# Patient Record
Sex: FEMALE | Race: BLACK OR AFRICAN AMERICAN | Employment: FULL TIME | URBAN - METROPOLITAN AREA
[De-identification: names, ages, dates, MRNs, and addresses within clinical notes are randomized per-mention and may not be internally consistent; named-entity substitution may affect disease eponyms.]

---

## 2017-02-02 ENCOUNTER — OFFICE VISIT (OUTPATIENT)
Dept: INTERNAL MEDICINE CLINIC | Age: 52
End: 2017-02-02

## 2017-02-02 VITALS
SYSTOLIC BLOOD PRESSURE: 129 MMHG | HEART RATE: 63 BPM | WEIGHT: 138.5 LBS | RESPIRATION RATE: 14 BRPM | BODY MASS INDEX: 21.74 KG/M2 | TEMPERATURE: 97.3 F | DIASTOLIC BLOOD PRESSURE: 81 MMHG | OXYGEN SATURATION: 100 % | HEIGHT: 67 IN

## 2017-02-02 DIAGNOSIS — Z13.0 SCREENING FOR DEFICIENCY ANEMIA: ICD-10-CM

## 2017-02-02 DIAGNOSIS — Z13.1 SCREENING FOR DIABETES MELLITUS: ICD-10-CM

## 2017-02-02 DIAGNOSIS — Z00.00 PHYSICAL EXAM, ANNUAL: Primary | ICD-10-CM

## 2017-02-02 DIAGNOSIS — Z11.59 NEED FOR HEPATITIS C SCREENING TEST: ICD-10-CM

## 2017-02-02 DIAGNOSIS — Z13.220 SCREENING FOR HYPERCHOLESTEROLEMIA: ICD-10-CM

## 2017-02-02 NOTE — PROGRESS NOTES
HISTORY OF PRESENT ILLNESS  Derik Mckay is a 46 y.o. female. HPI   She presents for annual physical.    Diet and Lifestyle: generally follows a low fat low cholesterol diet, generally follows a low sodium diet, exercises sporadically, nonsmoker     She was born in the [facility] and is a candidate for Hepatitis C screening. There is no problem list on file for this patient. History reviewed. No pertinent past medical history. Past Surgical History   Procedure Laterality Date    Pr myomectomy 1-4,w/tot 250gms/<,abd apprch      Hx hysterectomy  2009     Social History     Social History    Marital status:      Spouse name: N/A    Number of children: N/A    Years of education: N/A     Social History Main Topics    Smoking status: Never Smoker    Smokeless tobacco: Never Used    Alcohol use Yes      Comment: rare     Drug use: No    Sexual activity: Not Asked     Other Topics Concern    None     Social History Narrative     Family History   Problem Relation Age of Onset    Hypertension Mother      Allergies   Allergen Reactions    Naproxen Sodium Nausea and Vomiting    Other Medication Other (comments)     ? antibiotic     Current Outpatient Prescriptions   Medication Sig Dispense Refill    Cetirizine (ZYRTEC) 10 mg cap Take  by mouth. Review of Systems   Constitutional: Negative for malaise/fatigue and weight loss. HENT: Negative for congestion. Eyes: Negative for blurred vision and double vision. Respiratory: Negative for shortness of breath. Cardiovascular: Negative for chest pain, palpitations, orthopnea, claudication and leg swelling. Gastrointestinal: Negative for constipation, diarrhea and heartburn. Genitourinary: Negative for frequency and urgency. Musculoskeletal: Negative for back pain and joint pain. Skin: Negative for itching and rash. Neurological: Negative for dizziness, tingling, focal weakness and headaches. Psychiatric/Behavioral: Negative for depression. The patient is not nervous/anxious. Visit Vitals    /81 (BP 1 Location: Left arm, BP Patient Position: Sitting)    Pulse 63    Temp 97.3 °F (36.3 °C) (Oral)    Resp 14    Ht 5' 7\" (1.702 m)    Wt 138 lb 8 oz (62.8 kg)    SpO2 100%    BMI 21.69 kg/m2     Physical Exam   Constitutional: She is oriented to person, place, and time. She appears well-developed and well-nourished. HENT:   Head: Normocephalic and atraumatic. Eyes: Conjunctivae are normal. Pupils are equal, round, and reactive to light. Neck: Neck supple. Carotid bruit is not present. No thyromegaly present. Cardiovascular: Normal rate, regular rhythm and normal heart sounds. PMI is not displaced. Exam reveals no gallop. No murmur heard. Pulses:       Dorsalis pedis pulses are 2+ on the right side, and 2+ on the left side. Posterior tibial pulses are 2+ on the right side, and 2+ on the left side. Pulmonary/Chest: Effort normal. She has no wheezes. She has no rhonchi. She has no rales. Abdominal: Soft. Normal appearance. She exhibits no abdominal bruit and no mass. There is no hepatosplenomegaly. There is no tenderness. Musculoskeletal: She exhibits no edema. Lymphadenopathy:     She has no cervical adenopathy. Right: No supraclavicular adenopathy present. Left: No inguinal and no supraclavicular adenopathy present. Neurological: She is alert and oriented to person, place, and time. No sensory deficit. Skin: Skin is warm, dry and intact. No rash noted. Psychiatric: She has a normal mood and affect. Her behavior is normal.   Nursing note and vitals reviewed. ASSESSMENT and PLAN    ICD-10-CM ICD-9-CM    1. Physical exam, annual Z00.00 V70.0    2. Screening for hypercholesterolemia Z13.220 V77.91 LIPID PANEL   3. Screening for diabetes mellitus K71.6 P43.5 METABOLIC PANEL, COMPREHENSIVE   4.  Screening for deficiency anemia Z13.0 V78.1 CBC W/O DIFF   5. Need for hepatitis C screening test Z11.59 V73.89 HEPATITIS C AB       Physical exam, annual    Screening for hypercholesterolemia  -     LIPID PANEL; Future    Screening for diabetes mellitus  -     METABOLIC PANEL, COMPREHENSIVE; Future    Screening for deficiency anemia  -     CBC W/O DIFF; Future    Need for hepatitis C screening test  -     HEPATITIS C AB; Future      Follow-up Disposition:  Return in about 1 year (around 2/2/2018) for physical .  reviewed diet, exercise and weight control  I have discussed the diagnosis with the patient and the intended plan as seen in the above orders. Patient is in agreement. The patient has received an after-visit summary and questions were answered concerning future plans. I have discussed medication side effects and warnings with the patient as well.

## 2017-02-02 NOTE — PATIENT INSTRUCTIONS
Fasting lab soon  Make appointment for pap and mammogram  Return in one year    [address] [facility]  [phone]   James Medeiros M.D. NAVIN Cain., C.N.M. Katarina Andrews M.D. Axel Tate M.D. Chet Hernandez M.D. Jordy Cheema M.D. Nu Vences M.D. DUANE Ponce-BGUILHERME.   Lorenzo Lowery, Ph.D., L.P.C. Anson Bishop M.D.   Mimi Bowles M.D. ALEA Roman W.H.N.PMakenzie-BMakenzieCMakenzie Well Visit, Women 48 to 72: Care Instructions  Your Care Instructions  Physical exams can help you stay healthy. Your doctor has checked your overall health and may have suggested ways to take good care of yourself. He or she also may have recommended tests. At home, you can help prevent illness with healthy eating, regular exercise, and other steps. Follow-up care is a key part of your treatment and safety. Be sure to make and go to all appointments, and call your doctor if you are having problems. It's also a good idea to know your test results and keep a list of the medicines you take. How can you care for yourself at home? · Reach and stay at a healthy weight. This will lower your risk for many problems, such as obesity, diabetes, heart disease, and high blood pressure. · Get at least 30 minutes of exercise on most days of the week. Walking is a good choice. You also may want to do other activities, such as running, swimming, cycling, or playing tennis or team sports. · Do not smoke. Smoking can make health problems worse. If you need help quitting, talk to your doctor about stop-smoking programs and medicines. These can increase your chances of quitting for good. · Protect your skin from too much sun. When you're outdoors from 10 a.m. to 4 p.m., stay in the shade or cover up with clothing and a hat with a wide brim. Wear sunglasses that block UV rays.  Even when it's cloudy, put broad-spectrum sunscreen (SPF 30 or higher) on any exposed skin.  · See a dentist one or two times a year for checkups and to have your teeth cleaned. · Wear a seat belt in the car. · Limit alcohol to 1 drink a day. Too much alcohol can cause health problems. Follow your doctor's advice about when to have certain tests. These tests can spot problems early. · Cholesterol. Your doctor will tell you how often to have this done based on your age, family history, or other things that can increase your risk for heart attack and stroke. · Blood pressure. Have your blood pressure checked during a routine doctor visit. Your doctor will tell you how often to check your blood pressure based on your age, your blood pressure results, and other factors. · Mammogram. Ask your doctor how often you should have a mammogram, which is an X-ray of your breasts. A mammogram can spot breast cancer before it can be felt and when it is easiest to treat. · Pap test and pelvic exam. Ask your doctor how often you should have a Pap test. You may not need to have a Pap test as often as you used to. · Vision. Have your eyes checked every year or two or as often as your doctor suggests. Some experts recommend that you have yearly exams for glaucoma and other age-related eye problems starting at age 48. · Hearing. Tell your doctor if you notice any change in your hearing. You can have tests to find out how well you hear. · Diabetes. Ask your doctor whether you should have tests for diabetes. · Colon cancer. You should begin tests for colon cancer at age 48. You may have one of several tests. Your doctor will tell you how often to have tests based on your age and risk. Risks include whether you already had a precancerous polyp removed from your colon or whether your parents, sisters and brothers, or children have had colon cancer. · Thyroid disease.  Talk to your doctor about whether to have your thyroid checked as part of a regular physical exam. Women have an increased chance of a thyroid problem. · Osteoporosis. You should begin tests for bone density at age 72. If you are younger than 72, ask your doctor whether you have factors that may increase your risk for this disease. You may want to have this test before age 72. · Heart attack and stroke risk. At least every 4 to 6 years, you should have your risk for heart attack and stroke assessed. Your doctor uses factors such as your age, blood pressure, cholesterol, and whether you smoke or have diabetes to show what your risk for a heart attack or stroke is over the next 10 years. When should you call for help? Watch closely for changes in your health, and be sure to contact your doctor if you have any problems or symptoms that concern you. Where can you learn more? Go to http://gi-emeterio.info/. Enter P000 in the search box to learn more about \"Well Visit, Women 50 to 72: Care Instructions. \"  Current as of: July 19, 2016  Content Version: 11.1  © 1689-9461 [employer]. Care instructions adapted under license by [employer] (which disclaims liability or warranty for this information). If you have questions about a medical condition or this instruction, always ask your healthcare professional. [place] [address] any warranty or liability for your use of this information.

## 2017-02-02 NOTE — PROGRESS NOTES
Reviewed record In preparation for visit and have obtained necessary documentation. Given info on advanced directives. 1. Have you been to the ER, urgent care clinic or hospitalized since your last visit? No     2. Have you seen or consulted any other health care providers outside of the [address] [place] [address] since your last visit? Include any pap smears or colon screening.  No    Health Maintenance reviewed:

## 2017-02-02 NOTE — MR AVS SNAPSHOT
Visit Information Date & Time Provider Department Dept. Phone Encounter #  
 2/2/2017  2:00 PM Raphael Rutherford, [address] [facility] [phone] [id number] Follow-up Instructions Return in about 1 year (around 2/2/2018) for physical . Upcoming Health Maintenance Date Due Hepatitis C Screening 1965 BREAST CANCER SCRN MAMMOGRAM 2/1/2015 PAP AKA CERVICAL CYTOLOGY 2/1/2016 INFLUENZA AGE 9 TO ADULT 8/1/2016 DTaP/Tdap/Td series (2 - Td) 1/8/2018 COLONOSCOPY 7/23/2023 Allergies as of 2/2/2017  Review Complete On: 2/2/2017 By: Raphael Rutherford MD  
  
 Severity Noted Reaction Type Reactions Naproxen Sodium  03/05/2014   Side Effect Nausea and Vomiting Other Medication  08/23/2010    Other (comments) ? antibiotic Current Immunizations  Reviewed on 2/2/2017 Name Date Tdap 1/8/2008 Reviewed by Selvin Sanchez LPN on 1/6/2563 at  7:23 PM  
You Were Diagnosed With   
  
 Codes Comments Physical exam, annual    -  Primary ICD-10-CM: Z00.00 ICD-9-CM: V70.0 Screening for hypercholesterolemia     ICD-10-CM: Z13.220 ICD-9-CM: V77.91 Screening for diabetes mellitus     ICD-10-CM: Z13.1 ICD-9-CM: V77.1 Screening for deficiency anemia     ICD-10-CM: Z13.0 ICD-9-CM: V78.1 Need for hepatitis C screening test     ICD-10-CM: Z11.59 
ICD-9-CM: V73.89 Vitals BP Pulse Temp Resp Height(growth percentile) Weight(growth percentile) 129/81 (BP 1 Location: Left arm, BP Patient Position: Sitting) 63 97.3 °F (36.3 °C) (Oral) 14 5' 7\" (1.702 m) 138 lb 8 oz (62.8 kg) SpO2 BMI OB Status Smoking Status 100% 21.69 kg/m2 Hysterectomy Never Smoker BMI and BSA Data Body Mass Index Body Surface Area  
 21.69 kg/m 2 1.72 m 2 Preferred Pharmacy Pharmacy Name Phone [facility] [phone] Your Updated Medication List  
 This list is accurate as of: 2/2/17  2:31 PM.  Always use your most recent med list.  
  
  
  
  
 ZyrTEC 10 mg Cap Generic drug:  Cetirizine Take  by mouth. Follow-up Instructions Return in about 1 year (around 2/2/2018) for physical . To-Do List   
 02/03/2017 Lab:  CBC W/O DIFF   
  
 02/03/2017 Lab:  HEPATITIS C AB   
  
 02/03/2017 Lab:  LIPID PANEL   
  
 02/03/2017 Lab:  METABOLIC PANEL, COMPREHENSIVE Patient Instructions Fasting lab soon Make appointment for pap and mammogram 
Return in one year [facility] 
[phone] Ryan Bradley M.D. BIANCA Pedraza, C.ANTONETTE Thurman M.D. BRIAN Grace M.D. Buzzy Cree, M.D. Garnet Beau, M.D. ELKIN King.H.N.P.-B.C.  
Kimberley Bonds, Ph.D., L.P.C. Suzanne White M.D.  
Micky Martinez M.D. ALEA Renee W.H.N.P.-B.CMakenzie Well Visit, Women 48 to 72: Care Instructions Your Care Instructions Physical exams can help you stay healthy. Your doctor has checked your overall health and may have suggested ways to take good care of yourself. He or she also may have recommended tests. At home, you can help prevent illness with healthy eating, regular exercise, and other steps. Follow-up care is a key part of your treatment and safety. Be sure to make and go to all appointments, and call your doctor if you are having problems. It's also a good idea to know your test results and keep a list of the medicines you take. How can you care for yourself at home? · Reach and stay at a healthy weight. This will lower your risk for many problems, such as obesity, diabetes, heart disease, and high blood pressure. · Get at least 30 minutes of exercise on most days of the week. Walking is a good choice.  You also may want to do other activities, such as running, swimming, cycling, or playing tennis or team sports. · Do not smoke. Smoking can make health problems worse. If you need help quitting, talk to your doctor about stop-smoking programs and medicines. These can increase your chances of quitting for good. · Protect your skin from too much sun. When you're outdoors from 10 a.m. to 4 p.m., stay in the shade or cover up with clothing and a hat with a wide brim. Wear sunglasses that block UV rays. Even when it's cloudy, put broad-spectrum sunscreen (SPF 30 or higher) on any exposed skin. · See a dentist one or two times a year for checkups and to have your teeth cleaned. · Wear a seat belt in the car. · Limit alcohol to 1 drink a day. Too much alcohol can cause health problems. Follow your doctor's advice about when to have certain tests. These tests can spot problems early. · Cholesterol. Your doctor will tell you how often to have this done based on your age, family history, or other things that can increase your risk for heart attack and stroke. · Blood pressure. Have your blood pressure checked during a routine doctor visit. Your doctor will tell you how often to check your blood pressure based on your age, your blood pressure results, and other factors. · Mammogram. Ask your doctor how often you should have a mammogram, which is an X-ray of your breasts. A mammogram can spot breast cancer before it can be felt and when it is easiest to treat. · Pap test and pelvic exam. Ask your doctor how often you should have a Pap test. You may not need to have a Pap test as often as you used to. · Vision. Have your eyes checked every year or two or as often as your doctor suggests. Some experts recommend that you have yearly exams for glaucoma and other age-related eye problems starting at age 48. · Hearing. Tell your doctor if you notice any change in your hearing. You can have tests to find out how well you hear. · Diabetes. Ask your doctor whether you should have tests for diabetes. · Colon cancer. You should begin tests for colon cancer at age 48. You may have one of several tests. Your doctor will tell you how often to have tests based on your age and risk. Risks include whether you already had a precancerous polyp removed from your colon or whether your parents, sisters and brothers, or children have had colon cancer. · Thyroid disease. Talk to your doctor about whether to have your thyroid checked as part of a regular physical exam. Women have an increased chance of a thyroid problem. · Osteoporosis. You should begin tests for bone density at age 72. If you are younger than 72, ask your doctor whether you have factors that may increase your risk for this disease. You may want to have this test before age 72. · Heart attack and stroke risk. At least every 4 to 6 years, you should have your risk for heart attack and stroke assessed. Your doctor uses factors such as your age, blood pressure, cholesterol, and whether you smoke or have diabetes to show what your risk for a heart attack or stroke is over the next 10 years. When should you call for help? Watch closely for changes in your health, and be sure to contact your doctor if you have any problems or symptoms that concern you. Where can you learn more? Go to http://gi-emeterio.info/. Enter K777 in the search box to learn more about \"Well Visit, Women 50 to 72: Care Instructions. \" Current as of: July 19, 2016 Content Version: 11.1 © 1304-1430 [employer]. Care instructions adapted under license by [employer] (which disclaims liability or warranty for this information). If you have questions about a medical condition or this instruction, always ask your healthcare professional. [place] [address] any warranty or liability for your use of this information. Introducing [facility]! Fabian Neves introduces [employer] patient portal. Now you can access parts of your medical record, email your doctor's office, and request medication refills online. 1. In your internet browser, go to [email or web address] 2. Click on the First Time User? Click Here link in the Sign In box. You will see the New Member Sign Up page. 3. Enter your [employer] Access Code exactly as it appears below. You will not need to use this code after youve completed the sign-up process. If you do not sign up before the expiration date, you must request a new code. · [employer] Access Code: [email or web address] Expires: 5/3/2017  2:31 PM 
 
4. Enter the last four digits of your Social Security Number (xxxx) and Date of Birth (mm/dd/yyyy) as indicated and click Submit. You will be taken to the next sign-up page. 5. Create a [employer] ID. This will be your [employer] login ID and cannot be changed, so think of one that is secure and easy to remember. 6. Create a [employer] password. You can change your password at any time. 7. Enter your Password Reset Question and Answer. This can be used at a later time if you forget your password. 8. Enter your e-mail address. You will receive e-mail notification when new information is available in [address] 9. Click Sign Up. You can now view and download portions of your medical record. 10. Click the Download Summary menu link to download a portable copy of your medical information. If you have questions, please visit the Frequently Asked Questions section of the [employer] website. Remember, [employer] is NOT to be used for urgent needs. For medical emergencies, dial 911. Now available from your iPhone and Android! Please provide this summary of care documentation to your next provider. Your primary care clinician is listed as Kenya Acevedo. If you have any questions after today's visit, please call [phone].

## 2017-02-24 ENCOUNTER — OFFICE VISIT (OUTPATIENT)
Dept: INTERNAL MEDICINE CLINIC | Age: 52
End: 2017-02-24

## 2017-02-24 VITALS
BODY MASS INDEX: 21.5 KG/M2 | HEIGHT: 67 IN | RESPIRATION RATE: 14 BRPM | SYSTOLIC BLOOD PRESSURE: 130 MMHG | DIASTOLIC BLOOD PRESSURE: 84 MMHG | WEIGHT: 137 LBS | TEMPERATURE: 98.5 F | OXYGEN SATURATION: 97 % | HEART RATE: 76 BPM

## 2017-02-24 DIAGNOSIS — D64.9 ANEMIA, UNSPECIFIED TYPE: Primary | ICD-10-CM

## 2017-02-24 DIAGNOSIS — Z13.0 SCREENING FOR DEFICIENCY ANEMIA: ICD-10-CM

## 2017-02-24 DIAGNOSIS — Z13.1 SCREENING FOR DIABETES MELLITUS: ICD-10-CM

## 2017-02-24 DIAGNOSIS — Z13.220 SCREENING FOR HYPERCHOLESTEROLEMIA: ICD-10-CM

## 2017-02-24 DIAGNOSIS — Z11.59 NEED FOR HEPATITIS C SCREENING TEST: ICD-10-CM

## 2017-02-25 LAB
ALBUMIN SERPL-MCNC: 4.4 G/DL (ref 3.5–5.5)
ALBUMIN/GLOB SERPL: 1.3 {RATIO} (ref 1.1–2.5)
ALP SERPL-CCNC: 95 IU/L (ref 39–117)
ALT SERPL-CCNC: 18 IU/L (ref 0–32)
AST SERPL-CCNC: 17 IU/L (ref 0–40)
BILIRUB SERPL-MCNC: 0.3 MG/DL (ref 0–1.2)
BUN SERPL-MCNC: 9 MG/DL (ref 6–24)
BUN/CREAT SERPL: 13 (ref 9–23)
CALCIUM SERPL-MCNC: 9.3 MG/DL (ref 8.7–10.2)
CHLORIDE SERPL-SCNC: 100 MMOL/L (ref 96–106)
CHOLEST SERPL-MCNC: 190 MG/DL (ref 100–199)
CO2 SERPL-SCNC: 22 MMOL/L (ref 18–29)
CREAT SERPL-MCNC: 0.71 MG/DL (ref 0.57–1)
ERYTHROCYTE [DISTWIDTH] IN BLOOD BY AUTOMATED COUNT: 13.9 % (ref 12.3–15.4)
GLOBULIN SER CALC-MCNC: 3.5 G/DL (ref 1.5–4.5)
GLUCOSE SERPL-MCNC: 86 MG/DL (ref 65–99)
HCT VFR BLD AUTO: 33.4 % (ref 34–46.6)
HCV AB S/CO SERPL IA: <0.1 S/CO RATIO (ref 0–0.9)
HDLC SERPL-MCNC: 70 MG/DL
HGB BLD-MCNC: 10.8 G/DL (ref 11.1–15.9)
INTERPRETATION, 910389: NORMAL
LDLC SERPL CALC-MCNC: 104 MG/DL (ref 0–99)
MCH RBC QN AUTO: 26.5 PG (ref 26.6–33)
MCHC RBC AUTO-ENTMCNC: 32.3 G/DL (ref 31.5–35.7)
MCV RBC AUTO: 82 FL (ref 79–97)
PLATELET # BLD AUTO: 279 X10E3/UL (ref 150–379)
POTASSIUM SERPL-SCNC: 4 MMOL/L (ref 3.5–5.2)
PROT SERPL-MCNC: 7.9 G/DL (ref 6–8.5)
RBC # BLD AUTO: 4.08 X10E6/UL (ref 3.77–5.28)
SODIUM SERPL-SCNC: 141 MMOL/L (ref 134–144)
TRIGL SERPL-MCNC: 81 MG/DL (ref 0–149)
VLDLC SERPL CALC-MCNC: 16 MG/DL (ref 5–40)
WBC # BLD AUTO: 9 X10E3/UL (ref 3.4–10.8)

## 2017-02-27 PROBLEM — D64.9 ANEMIA: Status: ACTIVE | Noted: 2017-02-27

## 2017-02-27 NOTE — PROGRESS NOTES
Inform patient that lab is normal anemia. The anemia is worse, but it is unclear what the cause is. We should check iron profile, ferritin looking for iron deficiency, and hemoglobin fractionation looking for any blood traits.  (Unless a prior diagnosis of a trait has been made.)  Lab has been ordered. First check with phlebotomist to see if tests can be added to blood in the lab. If not she needs a lab appointment.

## 2017-03-11 ENCOUNTER — HOSPITAL ENCOUNTER (EMERGENCY)
Age: 52
Discharge: HOME | End: 2017-03-11
Attending: EMERGENCY MEDICINE

## 2017-03-11 VITALS
HEART RATE: 92 BPM | BODY MASS INDEX: 21.35 KG/M2 | TEMPERATURE: 97.3 F | SYSTOLIC BLOOD PRESSURE: 119 MMHG | RESPIRATION RATE: 15 BRPM | WEIGHT: 136 LBS | HEIGHT: 67 IN | DIASTOLIC BLOOD PRESSURE: 76 MMHG | OXYGEN SATURATION: 98 %

## 2017-03-11 DIAGNOSIS — J06.9 VIRAL UPPER RESPIRATORY TRACT INFECTION: Primary | ICD-10-CM

## 2017-03-11 DIAGNOSIS — M54.50 ACUTE LOW BACK PAIN WITHOUT SCIATICA, UNSPECIFIED BACK PAIN LATERALITY: ICD-10-CM

## 2017-03-11 RX ORDER — BENZONATATE 100 MG/1
100 CAPSULE ORAL
Qty: 30 CAP | Refills: 0 | Status: SHIPPED | OUTPATIENT
Start: 2017-03-11 | End: 2017-03-18

## 2017-03-11 NOTE — UC PROVIDER NOTE
Patient is a 46 y.o. female presenting with cough. The history is provided by the patient. Cough   This is a new problem. The current episode started 2 days ago. The problem occurs constantly. The problem has not changed since onset. The cough is non-productive. Patient reports a subjective fever - was not measured. Associated symptoms include chills, rhinorrhea and sore throat (scratchy). Pertinent negatives include no chest pain, no eye redness, no ear pain, no headaches, no myalgias, no wheezing, no nausea, no vomiting and no confusion. Associated symptoms comments: Lower back pulls and aches a bit with cough. She has tried nothing for the symptoms. She is not a smoker. History reviewed. No pertinent past medical history. Past Surgical History:   Procedure Laterality Date    HX HYSTERECTOMY  2009    MYOMECTOMY 1-4,W/TOT 250GMS/<,ABD APPRCH           Family History   Problem Relation Age of Onset    Hypertension Mother         Social History     Social History    Marital status:      Spouse name: N/A    Number of children: N/A    Years of education: N/A     Occupational History    Not on file. Social History Main Topics    Smoking status: Never Smoker    Smokeless tobacco: Never Used    Alcohol use Yes      Comment: rare     Drug use: No    Sexual activity: Not on file     Other Topics Concern    Not on file     Social History Narrative                ALLERGIES: Naproxen sodium and Other medication    Review of Systems   Constitutional: Positive for chills. HENT: Positive for rhinorrhea and sore throat (scratchy). Negative for ear pain. Eyes: Negative for redness. Respiratory: Positive for cough. Negative for wheezing. Cardiovascular: Negative for chest pain. Gastrointestinal: Negative for nausea and vomiting. Genitourinary: Negative. Musculoskeletal: Positive for back pain. Negative for myalgias. Skin: Negative. Neurological: Negative.   Negative for headaches. Psychiatric/Behavioral: Negative for confusion. Vitals:    03/11/17 1014   BP: 119/76   Pulse: 92   Resp: 15   Temp: 97.3 °F (36.3 °C)   SpO2: 98%   Weight: 61.7 kg (136 lb)   Height: 5' 7\" (1.702 m)       Physical Exam   Constitutional: She is oriented to person, place, and time. She appears well-developed and well-nourished. HENT:   Head: Normocephalic. Mouth/Throat: No oropharyngeal exudate. Clear nasal drainage and PND, no sinus pain   Eyes: Conjunctivae are normal.   Neck: Normal range of motion. No tracheal deviation present. No thyromegaly present. Cardiovascular: Normal rate, regular rhythm and normal heart sounds. No murmur heard. Pulmonary/Chest: Effort normal and breath sounds normal. No respiratory distress. She has no wheezes. She has no rales. She exhibits no tenderness. Musculoskeletal: Normal range of motion. She exhibits tenderness. She exhibits no edema. Some BL  Muscle tenderness in the lower lumbar erector spinae musculature. FROM and ambulatory, NVI distally and sensation equal and intact   Lymphadenopathy:     She has no cervical adenopathy. Neurological: She is alert and oriented to person, place, and time. She has normal reflexes. She exhibits normal muscle tone. Coordination normal.   Skin: Skin is warm. No erythema. Psychiatric: She has a normal mood and affect. Her behavior is normal.   Nursing note and vitals reviewed. MDM     Differential Diagnosis; Clinical Impression; Plan:     CLINICAL IMPRESSION:  Viral upper respiratory tract infection  (primary encounter diagnosis)  Acute low back pain without sciatica, unspecified back pain laterality    Plan:  1. Conservative care  2. Tessalon   3. rest    Risk of Significant Complications, Morbidity, and/or Mortality:   Presenting problems: Moderate  Management options:   Moderate  Progress:   Patient progress:  Stable      Procedures

## 2017-03-11 NOTE — DISCHARGE INSTRUCTIONS
Learning About How to Have a Healthy Back  What causes back pain? Back pain is often caused by overuse, strain, or injury. For example, people often hurt their backs playing sports or working in the yard, being jolted in a car accident, or lifting something too heavy. Aging plays a part too. Your bones and muscles tend to lose strength as you age, which makes injury more likely. The spongy discs between the bones of the spine (vertebrae) may suffer from wear and tear and no longer provide enough cushion between the bones. A disc that bulges or breaks open (herniated disc) can press on nerves, causing back pain. In some people, back pain is the result of arthritis, broken vertebrae caused by bone loss (osteoporosis), illness, or a spine problem. Although most people have back pain at one time or another, there are steps you can take to make it less likely. How can you have a healthy back? Reduce stress on your back through good posture  Slumping or slouching alone may not cause low back pain. But after the back has been strained or injured, bad posture can make pain worse. · Sleep in a position that maintains your back's normal curves and on a mattress that feels comfortable. Sleep on your side with a pillow between your knees, or sleep on your back with a pillow under your knees. These positions can reduce strain on your back. · Stand and sit up straight. \"Good posture\" generally means your ears, shoulders, and hips are in a straight line. · If you must stand for a long time, put one foot on a stool, ledge, or box. Switch feet every now and then. · Sit in a chair that is low enough to let you place both feet flat on the floor with both knees nearly level with your hips. If your chair or desk is too high, use a footrest to raise your knees. Place a small pillow, a rolled-up towel, or a lumbar roll in the curve of your back if you need extra support.   · Try a kneeling chair, which helps tilt your hips forward. This takes pressure off your lower back. · Try sitting on an exercise ball. It can rock from side to side, which helps keep your back loose. · When driving, keep your knees nearly level with your hips. Sit straight, and drive with both hands on the steering wheel. Your arms should be in a slightly bent position. Reduce stress on your back through careful lifting  · Squat down, bending at the hips and knees only. If you need to, put one knee to the floor and extend your other knee in front of you, bent at a right angle (half kneeling). · Press your chest straight forward. This helps keep your upper back straight while keeping a slight arch in your low back. · Hold the load as close to your body as possible, at the level of your belly button (navel). · Use your feet to change direction, taking small steps. · Lead with your hips as you change direction. Keep your shoulders in line with your hips as you move. · Set down your load carefully, squatting with your knees and hips only. Exercise and stretch your back  · Do some exercise on most days of the week, if your doctor says it is okay. You can walk, run, swim, or cycle. · Stretch your back muscles. Here are a few exercises to try:  Franklyn Grist on your back, and gently pull one bent knee to your chest. Put that foot back on the floor, and then pull the other knee to your chest.  ¨ Do pelvic tilts. Lie on your back with your knees bent. Tighten your stomach muscles. Pull your belly button (navel) in and up toward your ribs. You should feel like your back is pressing to the floor and your hips and pelvis are slightly lifting off the floor. Hold for 6 seconds while breathing smoothly. ¨ Sit with your back flat against a wall. · Keep your core muscles strong. The muscles of your back, belly (abdomen), and buttocks support your spine. ¨ Pull in your belly and imagine pulling your navel toward your spine. Hold this for 6 seconds, then relax.  Remember to keep breathing normally as you tense your muscles. ¨ Do curl-ups. Always do them with your knees bent. Keep your low back on the floor, and curl your shoulders toward your knees using a smooth, slow motion. Keep your arms folded across your chest. If this bothers your neck, try putting your hands behind your neck (not your head), with your elbows spread apart. ¨ Lie on your back with your knees bent and your feet flat on the floor. Tighten your belly muscles, and then push with your feet and raise your buttocks up a few inches. Hold this position 6 seconds as you continue to breathe normally, then lower yourself slowly to the floor. Repeat 8 to 12 times. ¨ If you like group exercise, try Pilates or yoga. These classes have poses that strengthen the core muscles. Lead a healthy lifestyle  · Stay at a healthy weight to avoid strain on your back. · Do not smoke. Smoking increases the risk of osteoporosis, which weakens the spine. If you need help quitting, talk to your doctor about stop-smoking programs and medicines. These can increase your chances of quitting for good. Where can you learn more? Go to http://giSame Day Servesemeterio.info/. Enter L315 in the search box to learn more about \"Learning About How to Have a Healthy Back. \"  Current as of: May 23, 2016  Content Version: 11.1  © 9224-7709 [employer]. Care instructions adapted under license by [employer] (which disclaims liability or warranty for this information). If you have questions about a medical condition or this instruction, always ask your healthcare professional. [place] [address] any warranty or liability for your use of this information. Upper Respiratory Infection (Cold): Care Instructions  Your Care Instructions    An upper respiratory infection, or URI, is an infection of the nose, sinuses, or throat. URIs are spread by coughs, sneezes, and direct contact.  The common cold is the most frequent kind of URI. The flu and sinus infections are other kinds of URIs. Almost all URIs are caused by viruses. Antibiotics won't cure them. But you can treat most infections with home care. This may include drinking lots of fluids and taking over-the-counter pain medicine. You will probably feel better in 4 to 10 days. The doctor has checked you carefully, but problems can develop later. If you notice any problems or new symptoms, get medical treatment right away. Follow-up care is a key part of your treatment and safety. Be sure to make and go to all appointments, and call your doctor if you are having problems. It's also a good idea to know your test results and keep a list of the medicines you take. How can you care for yourself at home? · To prevent dehydration, drink plenty of fluids, enough so that your urine is light yellow or clear like water. Choose water and other caffeine-free clear liquids until you feel better. If you have kidney, heart, or liver disease and have to limit fluids, talk with your doctor before you increase the amount of fluids you drink. · Take an over-the-counter pain medicine, such as acetaminophen (Tylenol), ibuprofen (Advil, Motrin), or naproxen (Aleve). Read and follow all instructions on the label. · Before you use cough and cold medicines, check the label. These medicines may not be safe for young children or for people with certain health problems. · Be careful when taking over-the-counter cold or flu medicines and Tylenol at the same time. Many of these medicines have acetaminophen, which is Tylenol. Read the labels to make sure that you are not taking more than the recommended dose. Too much acetaminophen (Tylenol) can be harmful. · Get plenty of rest.  · Do not smoke or allow others to smoke around you. If you need help quitting, talk to your doctor about stop-smoking programs and medicines. These can increase your chances of quitting for good.   When should you call for help? Call 911 anytime you think you may need emergency care. For example, call if:  · You have severe trouble breathing. Call your doctor now or seek immediate medical care if:  · You seem to be getting much sicker. · You have new or worse trouble breathing. · You have a new or higher fever. · You have a new rash. Watch closely for changes in your health, and be sure to contact your doctor if:  · You have a new symptom, such as a sore throat, an earache, or sinus pain. · You cough more deeply or more often, especially if you notice more mucus or a change in the color of your mucus. · You do not get better as expected. Where can you learn more? Go to http://gi-emeterio.info/. Enter V095 in the search box to learn more about \"Upper Respiratory Infection (Cold): Care Instructions. \"  Current as of: June 30, 2016  Content Version: 11.1  © 6374-9285 [employer]. Care instructions adapted under license by [employer] (which disclaims liability or warranty for this information). If you have questions about a medical condition or this instruction, always ask your healthcare professional. [address] any warranty or liability for your use of this information.

## 2017-10-27 ENCOUNTER — APPOINTMENT (OUTPATIENT)
Dept: CT IMAGING | Age: 52
End: 2017-10-27
Attending: EMERGENCY MEDICINE
Payer: COMMERCIAL

## 2017-10-27 ENCOUNTER — HOSPITAL ENCOUNTER (EMERGENCY)
Age: 52
Discharge: HOME | End: 2017-10-27
Attending: EMERGENCY MEDICINE | Admitting: EMERGENCY MEDICINE
Payer: COMMERCIAL

## 2017-10-27 VITALS
OXYGEN SATURATION: 100 % | SYSTOLIC BLOOD PRESSURE: 120 MMHG | TEMPERATURE: 97.6 F | DIASTOLIC BLOOD PRESSURE: 83 MMHG | RESPIRATION RATE: 16 BRPM | HEART RATE: 79 BPM

## 2017-10-27 DIAGNOSIS — R55 SYNCOPE, UNSPECIFIED SYNCOPE TYPE: Primary | ICD-10-CM

## 2017-10-27 DIAGNOSIS — F43.0 STRESS REACTION: ICD-10-CM

## 2017-10-27 LAB
ALBUMIN SERPL-MCNC: 3.7 G/DL (ref 3.5–5)
ALBUMIN/GLOB SERPL: 0.8 {RATIO} (ref 1.1–2.2)
ALP SERPL-CCNC: 108 U/L (ref 45–117)
ALT SERPL-CCNC: 27 U/L (ref 12–78)
ANION GAP SERPL CALC-SCNC: 5 MMOL/L (ref 5–15)
APPEARANCE UR: ABNORMAL
AST SERPL-CCNC: 21 U/L (ref 15–37)
BACTERIA URNS QL MICRO: ABNORMAL /HPF
BASOPHILS # BLD: 0 K/UL (ref 0–0.1)
BASOPHILS NFR BLD: 0 % (ref 0–1)
BILIRUB SERPL-MCNC: 0.3 MG/DL (ref 0.2–1)
BILIRUB UR QL: NEGATIVE
BUN SERPL-MCNC: 15 MG/DL (ref 6–20)
BUN/CREAT SERPL: 17 (ref 12–20)
CALCIUM SERPL-MCNC: 8.9 MG/DL (ref 8.5–10.1)
CHLORIDE SERPL-SCNC: 107 MMOL/L (ref 97–108)
CO2 SERPL-SCNC: 29 MMOL/L (ref 21–32)
COLOR UR: ABNORMAL
CREAT SERPL-MCNC: 0.9 MG/DL (ref 0.55–1.02)
EOSINOPHIL # BLD: 0 K/UL (ref 0–0.4)
EOSINOPHIL NFR BLD: 0 % (ref 0–7)
EPITH CASTS URNS QL MICRO: ABNORMAL /LPF
ERYTHROCYTE [DISTWIDTH] IN BLOOD BY AUTOMATED COUNT: 13.2 % (ref 11.5–14.5)
GLOBULIN SER CALC-MCNC: 4.9 G/DL (ref 2–4)
GLUCOSE SERPL-MCNC: 97 MG/DL (ref 65–100)
GLUCOSE UR STRIP.AUTO-MCNC: NEGATIVE MG/DL
HCT VFR BLD AUTO: 33.2 % (ref 35–47)
HGB BLD-MCNC: 10.4 G/DL (ref 11.5–16)
HGB UR QL STRIP: ABNORMAL
KETONES UR QL STRIP.AUTO: NEGATIVE MG/DL
LEUKOCYTE ESTERASE UR QL STRIP.AUTO: ABNORMAL
LYMPHOCYTES # BLD: 1.7 K/UL (ref 0.8–3.5)
LYMPHOCYTES NFR BLD: 23 % (ref 12–49)
MCH RBC QN AUTO: 26.2 PG (ref 26–34)
MCHC RBC AUTO-ENTMCNC: 31.3 G/DL (ref 30–36.5)
MCV RBC AUTO: 83.6 FL (ref 80–99)
MONOCYTES # BLD: 0.3 K/UL (ref 0–1)
MONOCYTES NFR BLD: 3 % (ref 5–13)
NEUTS SEG # BLD: 5.5 K/UL (ref 1.8–8)
NEUTS SEG NFR BLD: 74 % (ref 32–75)
NITRITE UR QL STRIP.AUTO: POSITIVE
PH UR STRIP: 6.5 [PH] (ref 5–8)
PLATELET # BLD AUTO: 254 K/UL (ref 150–400)
POTASSIUM SERPL-SCNC: 3.7 MMOL/L (ref 3.5–5.1)
PROT SERPL-MCNC: 8.6 G/DL (ref 6.4–8.2)
PROT UR STRIP-MCNC: NEGATIVE MG/DL
RBC # BLD AUTO: 3.97 M/UL (ref 3.8–5.2)
RBC #/AREA URNS HPF: ABNORMAL /HPF (ref 0–5)
SODIUM SERPL-SCNC: 141 MMOL/L (ref 136–145)
SP GR UR REFRACTOMETRY: 1.02 (ref 1–1.03)
TROPONIN I SERPL-MCNC: <0.04 NG/ML
UA: UC IF INDICATED,UAUC: ABNORMAL
UROBILINOGEN UR QL STRIP.AUTO: 1 EU/DL (ref 0.2–1)
WBC # BLD AUTO: 7.6 K/UL (ref 3.6–11)
WBC URNS QL MICRO: ABNORMAL /HPF (ref 0–4)

## 2017-10-27 PROCEDURE — 96360 HYDRATION IV INFUSION INIT: CPT

## 2017-10-27 PROCEDURE — 74011250636 HC RX REV CODE- 250/636: Performed by: EMERGENCY MEDICINE

## 2017-10-27 PROCEDURE — 85025 COMPLETE CBC W/AUTO DIFF WBC: CPT | Performed by: EMERGENCY MEDICINE

## 2017-10-27 PROCEDURE — 81001 URINALYSIS AUTO W/SCOPE: CPT | Performed by: EMERGENCY MEDICINE

## 2017-10-27 PROCEDURE — 70450 CT HEAD/BRAIN W/O DYE: CPT

## 2017-10-27 PROCEDURE — 93005 ELECTROCARDIOGRAM TRACING: CPT

## 2017-10-27 PROCEDURE — 87186 SC STD MICRODIL/AGAR DIL: CPT | Performed by: EMERGENCY MEDICINE

## 2017-10-27 PROCEDURE — 84484 ASSAY OF TROPONIN QUANT: CPT | Performed by: EMERGENCY MEDICINE

## 2017-10-27 PROCEDURE — 36415 COLL VENOUS BLD VENIPUNCTURE: CPT | Performed by: EMERGENCY MEDICINE

## 2017-10-27 PROCEDURE — 87077 CULTURE AEROBIC IDENTIFY: CPT | Performed by: EMERGENCY MEDICINE

## 2017-10-27 PROCEDURE — 99284 EMERGENCY DEPT VISIT MOD MDM: CPT

## 2017-10-27 PROCEDURE — 87086 URINE CULTURE/COLONY COUNT: CPT | Performed by: EMERGENCY MEDICINE

## 2017-10-27 PROCEDURE — 80053 COMPREHEN METABOLIC PANEL: CPT | Performed by: EMERGENCY MEDICINE

## 2017-10-27 RX ADMIN — SODIUM CHLORIDE 1000 ML: 900 INJECTION, SOLUTION INTRAVENOUS at 20:35

## 2017-10-27 NOTE — LETTER
[address] 
\[facility]\"" EMERGENCY DEPT 
[address] [place] [address] [address] [id number] 
[phone] Work/School Note Date: 10/27/2017 To Whom It May concern: 
 
Pepper Breath was seen and treated today in the emergency room by the following provider(s): 
Attending Provider: Mayito Whitt MD.   
 
Pepper Breath No work till 10/30/17 Sincerely, Mayito Whitt MD

## 2017-10-27 NOTE — ED PROVIDER NOTES
[facility].  EMERGENCY DEPARTMENT HISTORY AND PHYSICAL EXAM       Date of Service: 10/27/2017   Patient Name: Cecilia Garcia   YOB: 1965  Medical Record Number: [id number]    History of Presenting Illness     Chief Complaint   Patient presents with    Syncope     Pt came in via EMS for Syncope/ Seizure episode at home. Pt is under high amounts of stress. Pt stated her mother just passed         History Provided By:  patient and spouse    Additional History:   Cecilia Garcia is a 45 y.o. female who presents via EMS to the ED with cc of syncopal episode earlier today. Pt notes that she was sitting at the table talking on the phone, and the next thing she knew she was on the ground. She reports that she has been cooking all day, and was feeling hot and overheated. Pt notes that she has also had a lot of stress recently due to burying her mother earlier today. Per , pt was sitting in the chair when she had her syncopal episode.  reports that pt was sitting when she started vomiting, so he set her on the floor. Shortly afterwards pt woke up. Pt notes that she does not remember her syncopal episode. She reports that she has had a syncopal episode before in 2009. Pt denies headache, chest pain, abdominal pain, vaginal bleeding, rectal bleeding, head injury, or daily medications. Social Hx: - Tobacco, - EtOH, - Illicit Drugs    There are no other complaints, changes or physical findings at this time. Primary Care Provider: Emani Jane MD     Past History     Past Medical History:   History reviewed. No pertinent past medical history.      Past Surgical History:   Past Surgical History:   Procedure Laterality Date    HX HYSTERECTOMY  2009    MYOMECTOMY 1-4,W/TOT 250GMS/<,ABD APPRCH          Family History:   Family History   Problem Relation Age of Onset    Hypertension Mother         Social History:   Social History   Substance Use Topics  Smoking status: Never Smoker    Smokeless tobacco: Never Used    Alcohol use Yes      Comment: rare         Allergies: Allergies   Allergen Reactions    Naproxen Sodium Nausea and Vomiting    Other Medication Other (comments)     ? antibiotic         Review of Systems   Review of Systems   Constitutional: Negative. Negative for chills and fever. HENT: Positive for congestion. Negative for rhinorrhea. Respiratory: Negative. Negative for cough, chest tightness and wheezing. Cardiovascular: Negative. Negative for chest pain and palpitations. Gastrointestinal: Negative. Negative for abdominal pain, anal bleeding, constipation, nausea and vomiting. Endocrine: Negative. Genitourinary: Negative. Negative for decreased urine volume, flank pain, hematuria, pelvic pain and vaginal bleeding. Musculoskeletal: Negative. Negative for back pain and neck pain. Skin: Negative. Negative for color change, pallor and rash. Neurological: Positive for syncope. Negative for dizziness, seizures, weakness, numbness and headaches. Hematological: Negative. Negative for adenopathy. Psychiatric/Behavioral: Negative. All other systems reviewed and are negative. Physical Exam  Physical Exam   Constitutional: She is oriented to person, place, and time. She appears well-developed and well-nourished. No distress. HENT:   Head: Normocephalic and atraumatic. Head is without raccoon's eyes, without Park's sign and without contusion. Mouth/Throat: No oropharyngeal exudate. Eyes: Conjunctivae are normal. Pupils are equal, round, and reactive to light. Right eye exhibits no discharge. Left eye exhibits no discharge. No scleral icterus. Neck: Normal range of motion. Neck supple. No JVD present. No thyromegaly present. Cardiovascular: Normal rate, regular rhythm, normal heart sounds and intact distal pulses. Exam reveals no gallop and no friction rub. No murmur heard.   Pulmonary/Chest: Effort normal and breath sounds normal. No stridor. No respiratory distress. She has no wheezes. She has no rales. She exhibits no tenderness. Abdominal: Soft. Normal aorta. She exhibits no distension, no pulsatile midline mass and no mass. Bowel sounds are increased. There is no tenderness. There is no rebound, no guarding and negative Ricks's sign. Neurological: She is alert and oriented to person, place, and time. She has normal strength. She displays normal reflexes. No cranial nerve deficit or sensory deficit. She exhibits normal muscle tone. She displays a negative Romberg sign. Coordination normal. GCS eye subscore is 4. GCS verbal subscore is 5. GCS motor subscore is 6. Reflex Scores:       Patellar reflexes are 2+ on the right side and 2+ on the left side. Skin: Skin is warm. No rash noted. She is not diaphoretic. No pallor. Vitals reviewed. Medical Decision Making   I am the first provider for this patient. I reviewed the vital signs, available nursing notes, past medical history, past surgical history, family history and social history. Old Medical Records: Old medical records. Provider Notes:   DDx: seizure, arrhythmia, vasovagal episode, coronary syndrome, CVA, vertigo    Imp/plan: healthy 46 y.o. under much stress as she was at her mothers  today. Was cooking in the kitchen became overheated and had a brief fainting episode. She had vomited prior once to that. She denies any headache, chest pain, or abdominal pain. Her family states that she quickly came to after they helped her lay on the ground and there was no seizure activity. In ER not orthostatic, normal EKG, and labs as well as head CT. Improved with fluids. ED Course:  7:06 PM   Initial assessment performed. The patients presenting problems have been discussed, and they are in agreement with the care plan formulated and outlined with them.   I have encouraged them to ask questions as they arise throughout their visit. Diagnostic Study Results     Labs -      Recent Results (from the past 12 hour(s))   EKG, 12 LEAD, INITIAL    Collection Time: 10/27/17  7:54 PM   Result Value Ref Range    Ventricular Rate 64 BPM    Atrial Rate 64 BPM    P-R Interval 174 ms    QRS Duration 86 ms    Q-T Interval 412 ms    QTC Calculation (Bezet) 425 ms    Calculated P Axis 51 degrees    Calculated R Axis -10 degrees    Calculated T Axis 15 degrees    Diagnosis       Normal sinus rhythm  Normal ECG  When compared with ECG of 05-OCT-2009 11:37,  No significant change was found     CBC WITH AUTOMATED DIFF    Collection Time: 10/27/17  8:34 PM   Result Value Ref Range    WBC 7.6 3.6 - 11.0 K/uL    RBC 3.97 3.80 - 5.20 M/uL    HGB 10.4 (L) 11.5 - 16.0 g/dL    HCT 33.2 (L) 35.0 - 47.0 %    MCV 83.6 80.0 - 99.0 FL    MCH 26.2 26.0 - 34.0 PG    MCHC 31.3 30.0 - 36.5 g/dL    RDW 13.2 11.5 - 14.5 %    PLATELET 954 948 - 447 K/uL    NEUTROPHILS 74 32 - 75 %    LYMPHOCYTES 23 12 - 49 %    MONOCYTES 3 (L) 5 - 13 %    EOSINOPHILS 0 0 - 7 %    BASOPHILS 0 0 - 1 %    ABS. NEUTROPHILS 5.5 1.8 - 8.0 K/UL    ABS. LYMPHOCYTES 1.7 0.8 - 3.5 K/UL    ABS. MONOCYTES 0.3 0.0 - 1.0 K/UL    ABS. EOSINOPHILS 0.0 0.0 - 0.4 K/UL    ABS. BASOPHILS 0.0 0.0 - 0.1 K/UL   METABOLIC PANEL, COMPREHENSIVE    Collection Time: 10/27/17  8:34 PM   Result Value Ref Range    Sodium 141 136 - 145 mmol/L    Potassium 3.7 3.5 - 5.1 mmol/L    Chloride 107 97 - 108 mmol/L    CO2 29 21 - 32 mmol/L    Anion gap 5 5 - 15 mmol/L    Glucose 97 65 - 100 mg/dL    BUN 15 6 - 20 MG/DL    Creatinine 0.90 0.55 - 1.02 MG/DL    BUN/Creatinine ratio 17 12 - 20      GFR est AA >60 >60 ml/min/1.73m2    GFR est non-AA >60 >60 ml/min/1.73m2    Calcium 8.9 8.5 - 10.1 MG/DL    Bilirubin, total 0.3 0.2 - 1.0 MG/DL    ALT (SGPT) 27 12 - 78 U/L    AST (SGOT) 21 15 - 37 U/L    Alk.  phosphatase 108 45 - 117 U/L    Protein, total 8.6 (H) 6.4 - 8.2 g/dL    Albumin 3.7 3.5 - 5.0 g/dL    Globulin 4.9 (H) 2.0 - 4.0 g/dL    A-G Ratio 0.8 (L) 1.1 - 2.2     TROPONIN I    Collection Time: 10/27/17  8:34 PM   Result Value Ref Range    Troponin-I, Qt. <0.04 <0.05 ng/mL   URINALYSIS W/ REFLEX CULTURE    Collection Time: 10/27/17  9:42 PM   Result Value Ref Range    Color YELLOW/STRAW      Appearance CLOUDY (A) CLEAR      Specific gravity 1.019 1.003 - 1.030      pH (UA) 6.5 5.0 - 8.0      Protein NEGATIVE  NEG mg/dL    Glucose NEGATIVE  NEG mg/dL    Ketone NEGATIVE  NEG mg/dL    Bilirubin NEGATIVE  NEG      Blood TRACE (A) NEG      Urobilinogen 1.0 0.2 - 1.0 EU/dL    Nitrites POSITIVE (A) NEG      Leukocyte Esterase TRACE (A) NEG      WBC 5-10 0 - 4 /hpf    RBC 0-5 0 - 5 /hpf    Epithelial cells FEW FEW /lpf    Bacteria 4+ (A) NEG /hpf    UA:UC IF INDICATED URINE CULTURE ORDERED (A) CNI         Radiologic Studies -  The following have been ordered and reviewed:    CT Results  (Last 48 hours)               10/27/17 2015  CT HEAD WO CONT Final result    Impression:  IMPRESSION: No acute intracranial abnormality. Upper normal size of sella   turcica with no pituitary abnormality. Narrative:  EXAM:  CT HEAD WO CONT       INDICATION:   syncope       COMPARISON: None. CONTRAST:  None. TECHNIQUE: Unenhanced CT of the head was performed using 5 mm images. Brain and   bone windows were generated. CT dose reduction was achieved through use of a   standardized protocol tailored for this examination and automatic exposure   control for dose modulation. FINDINGS:   The ventricles and sulci are normal in size, shape and configuration and   midline. There is no significant white matter disease. There is no intracranial   hemorrhage, extra-axial collection, mass, mass effect or midline shift. The   basilar cisterns are open. No acute infarct is identified. The sella turcica is   upper normal in size, with no pituitary abnormality noted. The bone windows   demonstrate no additional abnormalities.  The visualized portions of the   paranasal sinuses and mastoid air cells are clear. Vital Signs-Reviewed the patient's vital signs. Patient Vitals for the past 12 hrs:   Temp Pulse Resp BP SpO2   10/27/17 2003 - 79 16 120/83 100 %   10/27/17 2000 - 71 16 108/78 100 %   10/27/17 1958 - 77 16 106/73 100 %   10/27/17 1841 97.6 °F (36.4 °C) 67 16 113/78 94 %       Medications Given in the ED:  Medications   sodium chloride 0.9 % bolus infusion 1,000 mL (0 mL IntraVENous IV Completed 10/27/17 2200)       Pulse Oximetry Analysis - Normal 94% on room air     Cardiac Monitor:   Rate: 67  Rhythm: Normal Sinus Rhythm     EKG interpretation: (Preliminary)  19:54  Rhythm: normal sinus rhythm; and regular . Rate (approx.): 64; Axis: normal; PA interval: normal; QRS interval: normal ; ST/T wave: normal; Other findings: borderline ekg. Written by Adam Gutierres, ED Scribe, as dictated by Melvi Gallardo MD    Diagnosis   Clinical Impression:   1. Syncope, unspecified syncope type    2. Stress reaction         Plan:  1:   Follow-up Information     Follow up With Details Comments Contact Info    Viviane Ramos MD Call in 2 days  [address]  [phone]      [facility] EMERGENCY DEPT  If symptoms worsen [address]  [phone]        2:   Discharge Medication List as of 10/27/2017  9:53 PM        Return to ED if Worse    Disposition Note:  DISCHARGE NOTE:  9:54 PM  The patient is ready for discharge. The patient's signs, symptoms, diagnosis, and discharge instructions have been discussed and the patient has conveyed their understanding. The patient is to follow up as recommended or return to the ER should their symptoms worsen. Plan has been discussed and the patient is in agreement.  _______________________________   Attestations:      This note is prepared by Adam Gutierres, acting as Scribe for MD Melvi Davies MD: The erin's documentation has been prepared under my direction and personally reviewed by me in its entirety.  I confirm that the note above accurately reflects all work, treatment, procedures, and medical decision making performed by me.  _______________________________

## 2017-10-27 NOTE — ED NOTES
Assumed care of patient from [facility] AND [facility], patient's family is requesting a room and has concerns about her being in the sanchez. When patient asked if she had concerns about being in the sanchez she stated \"no, I just want to go home\". Charge nurse informed of family concerns. Patient updated on plan of care.

## 2017-10-28 LAB
ATRIAL RATE: 64 BPM
CALCULATED P AXIS, ECG09: 51 DEGREES
CALCULATED R AXIS, ECG10: -10 DEGREES
CALCULATED T AXIS, ECG11: 15 DEGREES
DIAGNOSIS, 93000: NORMAL
P-R INTERVAL, ECG05: 174 MS
Q-T INTERVAL, ECG07: 412 MS
QRS DURATION, ECG06: 86 MS
QTC CALCULATION (BEZET), ECG08: 425 MS
VENTRICULAR RATE, ECG03: 64 BPM

## 2017-10-28 NOTE — ED NOTES
Pt received discharge instructions from Dr. Glen Rowley and verbalized understanding. Pt ambulatory to exit with a steady gait.

## 2017-10-28 NOTE — DISCHARGE INSTRUCTIONS
Fainting: Care Instructions  Your Care Instructions    When you faint, or pass out, you lose consciousness for a short time. A brief drop in blood flow to the brain often causes it. When you fall or lie down, more blood flows to your brain and you regain consciousness. Emotional stress, pain, or overheating-especially if you have been standing-can make you faint. In these cases, fainting is usually not serious. But fainting can be a sign of a more serious problem. Your doctor may want you to have more tests to rule out other causes. The treatment you need depends on the reason why you fainted. The doctor has checked you carefully, but problems can develop later. If you notice any problems or new symptoms, get medical treatment right away. Follow-up care is a key part of your treatment and safety. Be sure to make and go to all appointments, and call your doctor if you are having problems. It's also a good idea to know your test results and keep a list of the medicines you take. How can you care for yourself at home? · Drink plenty of fluids to prevent dehydration. If you have kidney, heart, or liver disease and have to limit fluids, talk with your doctor before you increase your fluid intake. When should you call for help? Call 911 anytime you think you may need emergency care. For example, call if:  ? · You have symptoms of a heart problem. These may include:  ¨ Chest pain or pressure. ¨ Severe trouble breathing. ¨ A fast or irregular heartbeat. ¨ Lightheadedness or sudden weakness. ¨ Coughing up pink, foamy mucus. ¨ Passing out. After you call 911, the  may tell you to chew 1 adult-strength or 2 to 4 low-dose aspirin. Wait for an ambulance. Do not try to drive yourself. ? · You have symptoms of a stroke. These may include:  ¨ Sudden numbness, tingling, weakness, or loss of movement in your face, arm, or leg, especially on only one side of your body. ¨ Sudden vision changes.   ¨ Sudden trouble speaking. ¨ Sudden confusion or trouble understanding simple statements. ¨ Sudden problems with walking or balance. ¨ A sudden, severe headache that is different from past headaches. ? · You passed out (lost consciousness) again. ? Watch closely for changes in your health, and be sure to contact your doctor if:  ? · You do not get better as expected. Where can you learn more? Go to http://gi-emeterio.info/. Enter W934 in the search box to learn more about \"Fainting: Care Instructions. \"  Current as of: 2017  Content Version: 11.4  © 6555-0618 [employer]. Care instructions adapted under license by [employer] (which disclaims liability or warranty for this information). If you have questions about a medical condition or this instruction, always ask your healthcare professional. [address] any warranty or liability for your use of this information. [employer] Activation    Thank you for requesting access to [employer]. Please follow the instructions below to securely access and download your online medical record. [employer] allows you to send messages to your doctor, view your test results, renew your prescriptions, schedule appointments, and more. How Do I Sign Up? 1. In your internet browser, go to [email or web address]  2. Click on the First Time User? Click Here link in the Sign In box. You will be redirect to the New Member Sign Up page. 3. Enter your [employer] Access Code exactly as it appears below. You will not need to use this code after youve completed the sign-up process. If you do not sign up before the expiration date, you must request a new code. [employer] Access Code: [email or web address]  Expires: 2018  9:53 PM (This is the date your [employer] access code will )    4. Enter the last four digits of your Social Security Number (xxxx) and Date of Birth (mm/dd/yyyy) as indicated and click Submit.  You will be taken to the next sign-up page. 5. Create a [employer] ID. This will be your [employer] login ID and cannot be changed, so think of one that is secure and easy to remember. 6. Create a [employer] password. You can change your password at any time. 7. Enter your Password Reset Question and Answer. This can be used at a later time if you forget your password. 8. Enter your e-mail address. You will receive e-mail notification when new information is available in [address]. 9. Click Sign Up. You can now view and download portions of your medical record. 10. Click the Download Summary menu link to download a portable copy of your medical information. Additional Information    If you have questions, please visit the Frequently Asked Questions section of the [employer] website at [email or web address] Remember, [employer] is NOT to be used for urgent needs. For medical emergencies, dial 911.

## 2017-10-30 LAB
BACTERIA SPEC CULT: ABNORMAL
CC UR VC: ABNORMAL
SERVICE CMNT-IMP: ABNORMAL

## 2017-10-30 RX ORDER — CEPHALEXIN 500 MG/1
500 CAPSULE ORAL 4 TIMES DAILY
Qty: 28 CAP | Refills: 0 | Status: SHIPPED | OUTPATIENT
Start: 2017-10-30 | End: 2017-11-06

## 2018-12-12 ENCOUNTER — OFFICE VISIT (OUTPATIENT)
Dept: INTERNAL MEDICINE CLINIC | Facility: CLINIC | Age: 53
End: 2018-12-12

## 2018-12-12 VITALS
OXYGEN SATURATION: 98 % | HEIGHT: 67 IN | DIASTOLIC BLOOD PRESSURE: 74 MMHG | SYSTOLIC BLOOD PRESSURE: 120 MMHG | HEART RATE: 64 BPM | RESPIRATION RATE: 16 BRPM | BODY MASS INDEX: 21.75 KG/M2 | TEMPERATURE: 98.3 F | WEIGHT: 138.6 LBS

## 2018-12-12 DIAGNOSIS — M54.50 ACUTE MIDLINE LOW BACK PAIN WITHOUT SCIATICA: Primary | ICD-10-CM

## 2018-12-12 DIAGNOSIS — R35.0 URINARY FREQUENCY: ICD-10-CM

## 2018-12-12 DIAGNOSIS — M54.6 ACUTE MIDLINE THORACIC BACK PAIN: ICD-10-CM

## 2018-12-12 LAB
BILIRUB UR QL STRIP: NEGATIVE
GLUCOSE UR-MCNC: NEGATIVE MG/DL
KETONES P FAST UR STRIP-MCNC: NEGATIVE MG/DL
PH UR STRIP: 7 [PH] (ref 4.6–8)
PROT UR QL STRIP: NEGATIVE
SP GR UR STRIP: 1.02 (ref 1–1.03)
UA UROBILINOGEN AMB POC: NORMAL (ref 0.2–1)
URINALYSIS CLARITY POC: CLEAR
URINALYSIS COLOR POC: YELLOW
URINE BLOOD POC: NEGATIVE
URINE LEUKOCYTES POC: NEGATIVE
URINE NITRITES POC: NEGATIVE

## 2018-12-12 NOTE — PROGRESS NOTES
HPI  Ms. Gomez Nye is a 52y.o. year old female, she is seen today for low back, worse with lifting, reaching. Pain for couple of weeks. Has had pain in past, not for a while though. No dysuria, no hematuria. No n/v/abd pain. Some chills with and sweats with hot flashes. Works at [employer]. Has been there 10 mos. Pain better with menthol pads. Hasn't tried any oral medications. Working 9-10 hours per shift, 7am - 4pm 3-5 days per week. Better on days off. Not as severe today. No known injury. Chief Complaint   Patient presents with    LOW BACK PAIN     Room 2B// NON fasting     Urinary Frequency     X \"a few weeks\"        Prior to Admission medications    Medication Sig Start Date End Date Taking? Authorizing Provider   Cetirizine (ZYRTEC) 10 mg cap Take  by mouth. Provider, Historical         Allergies   Allergen Reactions    Naproxen Sodium Nausea and Vomiting    Other Medication Other (comments)     ? antibiotic         REVIEW OF SYSTEMS:  Per HPI    PHYSICAL EXAM:  Visit Vitals  /74 (BP 1 Location: Left arm, BP Patient Position: Sitting)   Pulse 64   Temp 98.3 °F (36.8 °C) (Oral)   Resp 16   Ht 5' 7\" (1.702 m)   Wt 138 lb 9.6 oz (62.9 kg)   SpO2 98%   BMI 21.71 kg/m²     Constitutional: Appears well-developed and well-nourished. No distress. HENT:   Head: Normocephalic and atraumatic. Eyes: No scleral icterus. Cardiovascular: Normal S1/S2, regular rhythm. No murmurs, rubs, or gallops. Pulmonary/Chest: Effort normal and breath sounds normal. No respiratory distress. No wheezes, rhonchi, or rales. Abdomen: Soft, NT/ND, +BS, no rebound or guarding, no masses, no HSM appreciated. Back: +mild pain on palpation thoracic and lumbar spine  Ext: No edema. Neurological: Alert. Strength 5/5 b/l UE and LE. Psychiatric: Normal mood and affect.  Behavior is normal.     Lab Results   Component Value Date/Time    Sodium 141 10/27/2017 08:34 PM    Potassium 3.7 10/27/2017 08:34 PM    Chloride 107 10/27/2017 08:34 PM    CO2 29 10/27/2017 08:34 PM    Anion gap 5 10/27/2017 08:34 PM    Glucose 97 10/27/2017 08:34 PM    BUN 15 10/27/2017 08:34 PM    Creatinine 0.90 10/27/2017 08:34 PM    BUN/Creatinine ratio 17 10/27/2017 08:34 PM    GFR est AA >60 10/27/2017 08:34 PM    GFR est non-AA >60 10/27/2017 08:34 PM    Calcium 8.9 10/27/2017 08:34 PM    Bilirubin, total 0.3 10/27/2017 08:34 PM    AST (SGOT) 21 10/27/2017 08:34 PM    Alk. phosphatase 108 10/27/2017 08:34 PM    Protein, total 8.6 (H) 10/27/2017 08:34 PM    Albumin 3.7 10/27/2017 08:34 PM    Globulin 4.9 (H) 10/27/2017 08:34 PM    A-G Ratio 0.8 (L) 10/27/2017 08:34 PM    ALT (SGPT) 27 10/27/2017 08:34 PM     No results found for: HBA1C, HGBE8, HQZ6CHPS   Lab Results   Component Value Date/Time    Cholesterol, total 190 02/24/2017 03:49 PM    HDL Cholesterol 70 02/24/2017 03:49 PM    LDL, calculated 104 (H) 02/24/2017 03:49 PM    VLDL, calculated 16 02/24/2017 03:49 PM    Triglyceride 81 02/24/2017 03:49 PM          ASSESSMENT/PLAN  Diagnoses and all orders for this visit:    1. Acute midline low back pain without sciatica    2. Urinary frequency  -     AMB POC URINALYSIS DIP STICK AUTO W/O MICRO    3. Acute midline thoracic back pain      Tylenol 1000mg every 8 hours as needed for pain. Try heat to painful areas and stretches. No evidence of infection on UA. Suspect pain from standing long hours at work on hard surface with lifting, bending. Health Maintenance Due   Topic Date Due    BREAST CANCER SCRN MAMMOGRAM  02/01/2015    PAP AKA CERVICAL CYTOLOGY  02/01/2016    DTaP/Tdap/Td series (2 - Td) 01/08/2018        Follow-up Disposition:  Return if symptoms worsen or fail to improve. Reviewed plan of care. Patient has provided input and agrees with goals.      The nurse provided the patient and/or family with advanced directive information if needed and encouraged the patient to provide a copy to the office when available.

## 2018-12-12 NOTE — PATIENT INSTRUCTIONS
Tylenol 1000mg every 8 hours as needed for pain. Try heat to painful areas and stretches. Acute Low Back Pain: Exercises  Your Care Instructions  Here are some examples of typical rehabilitation exercises for your condition. Start each exercise slowly. Ease off the exercise if you start to have pain. Your doctor or physical therapist will tell you when you can start these exercises and which ones will work best for you. When you are not being active, find a comfortable position for rest. Some people are comfortable on the floor or a medium-firm bed with a small pillow under their head and another under their knees. Some people prefer to lie on their side with a pillow between their knees. Don't stay in one position for too long. Take short walks (10 to 20 minutes) every 2 to 3 hours. Avoid slopes, hills, and stairs until you feel better. Walk only distances you can manage without pain, especially leg pain. How to do the exercises  Back stretches    1. Get down on your hands and knees on the floor. 2. Relax your head and allow it to droop. Round your back up toward the ceiling until you feel a nice stretch in your upper, middle, and lower back. Hold this stretch for as long as it feels comfortable, or about 15 to 30 seconds. 3. Return to the starting position with a flat back while you are on your hands and knees. 4. Let your back sway by pressing your stomach toward the floor. Lift your buttocks toward the ceiling. 5. Hold this position for 15 to 30 seconds. 6. Repeat 2 to 4 times. Follow-up care is a key part of your treatment and safety. Be sure to make and go to all appointments, and call your doctor if you are having problems. It's also a good idea to know your test results and keep a list of the medicines you take. Where can you learn more? Go to http://gi-emeterio.info/. Enter W639 in the search box to learn more about \"Acute Low Back Pain: Exercises. \"  Current as of: November 29, 2017  Content Version: 11.8  © 8288-3668 [employer]. Care instructions adapted under license by [employer] (which disclaims liability or warranty for this information). If you have questions about a medical condition or this instruction, always ask your healthcare professional. [address] any warranty or liability for your use of this information. Healthy Upper Back: Exercises  Your Care Instructions  Here are some examples of exercises for your upper back. Start each exercise slowly. Ease off the exercise if you start to have pain. Your doctor or physical therapist will tell you when you can start these exercises and which ones will work best for you. How to do the exercises  Lower neck and upper back stretch    7. Stretch your arms out in front of your body. Clasp one hand on top of your other hand. 8. Gently reach out so that you feel your shoulder blades stretching away from each other. 9. Gently bend your head forward. 10. Hold for 15 to 30 seconds. 11. Repeat 2 to 4 times. Midback stretch    1. Kneel on the floor, and sit back on your ankles. 2. Lean forward, place your hands on the floor, and stretch your arms out in front of you. Rest your head between your arms. 3. Gently push your chest toward the floor, reaching as far in front of you as possible. 4. Hold for 15 to 30 seconds. 5. Repeat 2 to 4 times. Shoulder rolls    1. Sit comfortably with your feet shoulder-width apart. You can also do this exercise while standing. 2. Roll your shoulders up, then back, and then down in a smooth, circular motion. 3. Repeat 2 to 4 times. Wall push-up    1. Stand against a wall with your feet about 12 to 24 inches back from the wall. If you feel any pain when you do this exercise, stand closer to the wall. 2. Place your hands on the wall slightly wider apart than your shoulders, and lean forward.   3. Gently lean your body toward the wall. Then push back to your starting position. Keep the motion smooth and controlled. 4. Repeat 8 to 12 times. Resisted shoulder blade squeeze    1. Sit or stand, holding the band in both hands in front of you. Keep your elbows close to your sides, bent at a 90-degree angle. Your palms should face up. 2. Squeeze your shoulder blades together, and move your arms to the outside, stretching the band. Be sure to keep your elbows at your sides while you do this. 3. Relax. 4. Repeat 8 to 12 times. Resisted rows    1. Put the band around a solid object, such as a bedpost, at about waist level. Hold one end of the band in each hand. 2. With your elbows at your sides and bent to 90 degrees, pull the band back to move your shoulder blades toward each other. Return to the starting position. 3. Repeat 8 to 12 times. Follow-up care is a key part of your treatment and safety. Be sure to make and go to all appointments, and call your doctor if you are having problems. It's also a good idea to know your test results and keep a list of the medicines you take. Where can you learn more? Go to http://gi-emeterio.info/. Enter H892 in the search box to learn more about \"Healthy Upper Back: Exercises. \"  Current as of: November 29, 2017  Content Version: 11.8  © 6906-7742 [employer]. Care instructions adapted under license by [employer] (which disclaims liability or warranty for this information). If you have questions about a medical condition or this instruction, always ask your healthcare professional. [address] any warranty or liability for your use of this information.

## 2018-12-12 NOTE — PROGRESS NOTES
Bhaskar Del Rosario  Identified pt with two pt identifiers(name and ). Chief Complaint   Patient presents with    LOW BACK PAIN     Room 2B// NON fasting     Urinary Frequency     X \"a few weeks\"       1. Have you been to the ER, urgent care clinic since your last visit? Hospitalized since your last visit? NO    2. Have you seen or consulted any other health care providers outside of the [address] [place] [address] since your last visit? Include any pap smears or colon screening. NO      Provider notified of reason for visit, vitals and flowsheets obtained on patients. Patient received paperwork for advance directive during previous visit but has not completed at this time     Reviewed record In preparation for visit, huddled with provider and have obtained necessary documentation      Health Maintenance Due   Topic    BREAST CANCER SCRN MAMMOGRAM     PAP AKA CERVICAL CYTOLOGY     DTaP/Tdap/Td series (2 - Td)       Wt Readings from Last 3 Encounters:   17 136 lb (61.7 kg)   17 137 lb (62.1 kg)   17 138 lb 8 oz (62.8 kg)     Temp Readings from Last 3 Encounters:   10/27/17 97.6 °F (36.4 °C)   17 97.3 °F (36.3 °C)   17 98.5 °F (36.9 °C) (Oral)     BP Readings from Last 3 Encounters:   10/27/17 120/83   17 119/76   17 130/84     Pulse Readings from Last 3 Encounters:   10/27/17 79   17 92   17 76     There were no vitals filed for this visit.       Learning Assessment:  :     Learning Assessment 2015   PRIMARY LEARNER Patient   HIGHEST LEVEL OF EDUCATION - PRIMARY LEARNER  [employer]   BARRIERS PRIMARY LEARNER NONE   CO-LEARNER CAREGIVER No   PRIMARY LANGUAGE ENGLISH   LEARNER PREFERENCE PRIMARY DEMONSTRATION   ANSWERED BY patient   RELATIONSHIP SELF       Depression Screening:  :     PHQ over the last two weeks 2018   Little interest or pleasure in doing things Not at all   Feeling down, depressed, irritable, or hopeless Not at all   Total Score PHQ 2 0       Fall Risk Assessment:  :     Fall Risk Assessment, last 12 mths 12/12/2018   Able to walk? Yes   Fall in past 12 months? No       Abuse Screening:  :     Abuse Screening Questionnaire 12/12/2018   Do you ever feel afraid of your partner? N   Are you in a relationship with someone who physically or mentally threatens you? N   Is it safe for you to go home? Y       ADL Screening:  :     ADL Assessment 9/8/2015   Feeding yourself No Help Needed   Getting from bed to chair No Help Needed   Getting dressed No Help Needed   Bathing or showering No Help Needed   Walk across the room (includes cane/walker) No Help Needed   Using the telphone No Help Needed   Taking your medications No Help Needed   Preparing meals No Help Needed   Managing money (expenses/bills) No Help Needed   Moderately strenuous housework (laundry) No Help Needed   Shopping for personal items (toiletries/medicines) No Help Needed   Shopping for groceries No Help Needed   Driving No Help Needed   Climbing a flight of stairs No Help Needed   Getting to places beyond walking distances No Help Needed         Medication reconciliation up to date and corrected with patient at this time.

## 2019-02-08 ENCOUNTER — OFFICE VISIT (OUTPATIENT)
Dept: INTERNAL MEDICINE CLINIC | Facility: CLINIC | Age: 54
End: 2019-02-08

## 2019-02-08 VITALS
SYSTOLIC BLOOD PRESSURE: 114 MMHG | RESPIRATION RATE: 16 BRPM | HEART RATE: 67 BPM | WEIGHT: 135.4 LBS | DIASTOLIC BLOOD PRESSURE: 78 MMHG | BODY MASS INDEX: 21.25 KG/M2 | TEMPERATURE: 98.3 F | HEIGHT: 67 IN | OXYGEN SATURATION: 97 %

## 2019-02-08 DIAGNOSIS — D64.9 ANEMIA, UNSPECIFIED TYPE: ICD-10-CM

## 2019-02-08 DIAGNOSIS — Z00.00 ANNUAL PHYSICAL EXAM: Primary | ICD-10-CM

## 2019-02-08 NOTE — PATIENT INSTRUCTIONS

## 2019-02-08 NOTE — PROGRESS NOTES
Lester Fernandez  Identified pt with two pt identifiers(name and ). Chief Complaint   Patient presents with   [facility] Annual Wellness Visit     Fax to Reinier Silver [phone]       Reviewed record In preparation for visit and have obtained necessary documentation. Has info on advanced directive but has not filled them out. 1. Have you been to the ER, urgent care clinic or hospitalized since your last visit? No     2. Have you seen or consulted any other health care providers outside of the [address] [place] [address] since your last visit? Include any pap smears or colon screening. No    Vitals reviewed with provider.     Health Maintenance reviewed:   [facility] for Security Scorecard and PAP  Health Maintenance Due   Topic    BREAST CANCER SCRN MAMMOGRAM     PAP AKA CERVICAL CYTOLOGY     DTaP/Tdap/Td series (2 - Td)          Wt Readings from Last 3 Encounters:   19 135 lb 6.4 oz (61.4 kg)   18 138 lb 9.6 oz (62.9 kg)   17 136 lb (61.7 kg)        Temp Readings from Last 3 Encounters:   19 98.3 °F (36.8 °C) (Oral)   18 98.3 °F (36.8 °C) (Oral)   10/27/17 97.6 °F (36.4 °C)        BP Readings from Last 3 Encounters:   19 114/78   18 120/74   10/27/17 120/83        Pulse Readings from Last 3 Encounters:   19 67   18 64   10/27/17 79        Vitals:    19 1000   BP: 114/78   Pulse: 67   Resp: 16   Temp: 98.3 °F (36.8 °C)   TempSrc: Oral   SpO2: 97%   Weight: 135 lb 6.4 oz (61.4 kg)   Height: 5' 7\" (1.702 m)   PainSc:   0 - No pain          Learning Assessment:   :       Learning Assessment 2015   PRIMARY LEARNER Patient   HIGHEST LEVEL OF EDUCATION - PRIMARY LEARNER  [employer]   BARRIERS PRIMARY LEARNER NONE   CO-LEARNER CAREGIVER No   PRIMARY LANGUAGE ENGLISH   LEARNER PREFERENCE PRIMARY DEMONSTRATION   ANSWERED BY patient   RELATIONSHIP SELF        Depression Screening:   :       PHQ over the last two weeks 2019   Little interest or pleasure in doing things Not at all   Feeling down, depressed, irritable, or hopeless Not at all   Total Score PHQ 2 0        Fall Risk Assessment:   :       Fall Risk Assessment, last 12 mths 12/12/2018   Able to walk? Yes   Fall in past 12 months? No        Abuse Screening:   :       Abuse Screening Questionnaire 12/12/2018   Do you ever feel afraid of your partner? N   Are you in a relationship with someone who physically or mentally threatens you? N   Is it safe for you to go home?  Y        ADL Screening:   :       ADL Assessment 9/8/2015   Feeding yourself No Help Needed   Getting from bed to chair No Help Needed   Getting dressed No Help Needed   Bathing or showering No Help Needed   Walk across the room (includes cane/walker) No Help Needed   Using the telphone No Help Needed   Taking your medications No Help Needed   Preparing meals No Help Needed   Managing money (expenses/bills) No Help Needed   Moderately strenuous housework (laundry) No Help Needed   Shopping for personal items (toiletries/medicines) No Help Needed   Shopping for groceries No Help Needed   Driving No Help Needed   Climbing a flight of stairs No Help Needed   Getting to places beyond walking distances No Help Needed

## 2019-02-09 LAB
ALBUMIN SERPL-MCNC: 4.4 G/DL (ref 3.5–5.5)
ALBUMIN/GLOB SERPL: 1.3 {RATIO} (ref 1.2–2.2)
ALP SERPL-CCNC: 91 IU/L (ref 39–117)
ALT SERPL-CCNC: 19 IU/L (ref 0–32)
AST SERPL-CCNC: 20 IU/L (ref 0–40)
BASOPHILS # BLD AUTO: 0 X10E3/UL (ref 0–0.2)
BASOPHILS NFR BLD AUTO: 0 %
BILIRUB SERPL-MCNC: 0.5 MG/DL (ref 0–1.2)
BUN SERPL-MCNC: 10 MG/DL (ref 6–24)
BUN/CREAT SERPL: 16 (ref 9–23)
CALCIUM SERPL-MCNC: 9.7 MG/DL (ref 8.7–10.2)
CHLORIDE SERPL-SCNC: 102 MMOL/L (ref 96–106)
CHOLEST SERPL-MCNC: 213 MG/DL (ref 100–199)
CO2 SERPL-SCNC: 25 MMOL/L (ref 20–29)
CREAT SERPL-MCNC: 0.64 MG/DL (ref 0.57–1)
EOSINOPHIL # BLD AUTO: 0.1 X10E3/UL (ref 0–0.4)
EOSINOPHIL NFR BLD AUTO: 1 %
ERYTHROCYTE [DISTWIDTH] IN BLOOD BY AUTOMATED COUNT: 14.1 % (ref 12.3–15.4)
GLOBULIN SER CALC-MCNC: 3.5 G/DL (ref 1.5–4.5)
GLUCOSE SERPL-MCNC: 78 MG/DL (ref 65–99)
HCT VFR BLD AUTO: 33.8 % (ref 34–46.6)
HDLC SERPL-MCNC: 76 MG/DL
HGB BLD-MCNC: 10.8 G/DL (ref 11.1–15.9)
IMM GRANULOCYTES # BLD AUTO: 0 X10E3/UL (ref 0–0.1)
IMM GRANULOCYTES NFR BLD AUTO: 0 %
LDLC SERPL CALC-MCNC: 120 MG/DL (ref 0–99)
LYMPHOCYTES # BLD AUTO: 2.2 X10E3/UL (ref 0.7–3.1)
LYMPHOCYTES NFR BLD AUTO: 42 %
MCH RBC QN AUTO: 26.7 PG (ref 26.6–33)
MCHC RBC AUTO-ENTMCNC: 32 G/DL (ref 31.5–35.7)
MCV RBC AUTO: 84 FL (ref 79–97)
MONOCYTES # BLD AUTO: 0.3 X10E3/UL (ref 0.1–0.9)
MONOCYTES NFR BLD AUTO: 5 %
NEUTROPHILS # BLD AUTO: 2.8 X10E3/UL (ref 1.4–7)
NEUTROPHILS NFR BLD AUTO: 52 %
PLATELET # BLD AUTO: 257 X10E3/UL (ref 150–379)
POTASSIUM SERPL-SCNC: 4.4 MMOL/L (ref 3.5–5.2)
PROT SERPL-MCNC: 7.9 G/DL (ref 6–8.5)
RBC # BLD AUTO: 4.05 X10E6/UL (ref 3.77–5.28)
SODIUM SERPL-SCNC: 143 MMOL/L (ref 134–144)
TRIGL SERPL-MCNC: 84 MG/DL (ref 0–149)
VLDLC SERPL CALC-MCNC: 17 MG/DL (ref 5–40)
WBC # BLD AUTO: 5.4 X10E3/UL (ref 3.4–10.8)

## 2019-02-10 NOTE — PROGRESS NOTES
Pls let patient know her blood sugar, kidney, liver labs l are all normal. Keep up the good work! Her anemia is stable. Please let patient know her cholesterol is elevated. Ways to decrease cholesterol include decreasing saturated fats in the diet found in many packaged foods and fried foods, increasing exercise (whcih helps raise our good protective cholesterol) and weight loss if indicated. Eating a diet high in vegetables and fruits is also helpful. pls also send a copy of her labs plus OV note to the number they requested for ins.

## 2019-06-23 ENCOUNTER — APPOINTMENT (OUTPATIENT)
Dept: GENERAL RADIOLOGY | Age: 54
End: 2019-06-23
Attending: EMERGENCY MEDICINE
Payer: COMMERCIAL

## 2019-06-23 ENCOUNTER — HOSPITAL ENCOUNTER (EMERGENCY)
Age: 54
Discharge: HOME | End: 2019-06-23
Attending: EMERGENCY MEDICINE
Payer: COMMERCIAL

## 2019-06-23 VITALS
BODY MASS INDEX: 20.31 KG/M2 | DIASTOLIC BLOOD PRESSURE: 94 MMHG | RESPIRATION RATE: 16 BRPM | WEIGHT: 134.04 LBS | HEIGHT: 68 IN | TEMPERATURE: 98.2 F | SYSTOLIC BLOOD PRESSURE: 138 MMHG | OXYGEN SATURATION: 99 % | HEART RATE: 63 BPM

## 2019-06-23 DIAGNOSIS — S39.012A ACUTE MYOFASCIAL STRAIN OF LUMBAR REGION, INITIAL ENCOUNTER: ICD-10-CM

## 2019-06-23 DIAGNOSIS — M62.838 MUSCLE SPASM: ICD-10-CM

## 2019-06-23 DIAGNOSIS — M54.50 ACUTE MIDLINE LOW BACK PAIN WITHOUT SCIATICA: Primary | ICD-10-CM

## 2019-06-23 PROCEDURE — 74011000250 HC RX REV CODE- 250: Performed by: EMERGENCY MEDICINE

## 2019-06-23 PROCEDURE — 74011250636 HC RX REV CODE- 250/636: Performed by: EMERGENCY MEDICINE

## 2019-06-23 PROCEDURE — 72100 X-RAY EXAM L-S SPINE 2/3 VWS: CPT

## 2019-06-23 PROCEDURE — 96372 THER/PROPH/DIAG INJ SC/IM: CPT

## 2019-06-23 PROCEDURE — 99283 EMERGENCY DEPT VISIT LOW MDM: CPT

## 2019-06-23 RX ORDER — DICLOFENAC SODIUM 50 MG/1
50 TABLET, DELAYED RELEASE ORAL 2 TIMES DAILY
Qty: 20 TAB | Refills: 0 | Status: SHIPPED | OUTPATIENT
Start: 2019-06-23 | End: 2020-01-03

## 2019-06-23 RX ORDER — KETOROLAC TROMETHAMINE 30 MG/ML
30 INJECTION, SOLUTION INTRAMUSCULAR; INTRAVENOUS
Status: COMPLETED | OUTPATIENT
Start: 2019-06-23 | End: 2019-06-23

## 2019-06-23 RX ORDER — DICLOFENAC SODIUM 10 MG/G
GEL TOPICAL 4 TIMES DAILY
Qty: 1 EACH | Refills: 0 | Status: SHIPPED | OUTPATIENT
Start: 2019-06-23 | End: 2019-06-23

## 2019-06-23 RX ORDER — CYCLOBENZAPRINE HCL 10 MG
10 TABLET ORAL
Qty: 20 TAB | Refills: 0 | Status: SHIPPED | OUTPATIENT
Start: 2019-06-23 | End: 2020-01-03

## 2019-06-23 RX ORDER — CYCLOBENZAPRINE HCL 10 MG
10 TABLET ORAL
Qty: 20 TAB | Refills: 0 | Status: SHIPPED | OUTPATIENT
Start: 2019-06-23 | End: 2019-06-23

## 2019-06-23 RX ORDER — LIDOCAINE 4 G/100G
1 PATCH TOPICAL ONCE
Status: DISCONTINUED | OUTPATIENT
Start: 2019-06-23 | End: 2019-06-23 | Stop reason: HOSPADM

## 2019-06-23 RX ORDER — DICLOFENAC SODIUM 10 MG/G
GEL TOPICAL 4 TIMES DAILY
Qty: 1 EACH | Refills: 0 | Status: SHIPPED | OUTPATIENT
Start: 2019-06-23 | End: 2020-01-03

## 2019-06-23 RX ORDER — DICLOFENAC SODIUM 50 MG/1
50 TABLET, DELAYED RELEASE ORAL 2 TIMES DAILY
Qty: 20 TAB | Refills: 0 | Status: SHIPPED | OUTPATIENT
Start: 2019-06-23 | End: 2019-06-23

## 2019-06-23 RX ADMIN — KETOROLAC TROMETHAMINE 30 MG: 30 INJECTION, SOLUTION INTRAMUSCULAR at 03:07

## 2019-06-23 NOTE — ED PROVIDER NOTES
EMERGENCY DEPARTMENT HISTORY AND PHYSICAL EXAM      Date: 6/23/2019  Patient Name: aMvis Hooper  Patient Age and Sex: 62 y.o. female    History of Presenting Illness     Chief Complaint   Patient presents with    Back Pain     lower back pain for weeks, worse today       History Provided By: Patient    HPI: Mavis Hooper, 62 y.o. female with PMHx significant for none presents to the ED with c/o of two weeks of persistent, atraumatic lower back pain. Pt reports using ice and warm compresses with minimal relief of pain. Pt reports pain is lower back and states she might have slept on it funny which prompted ED visit. She denies any associated urinary or stool incontinence, she denies any saddle anesthesia. Pt denies any other alleviating or exacerbating factors. Additionally, pt specifically denies any recent fever, chills, headache, nausea, vomiting, abdominal pain, CP, SOB, lightheadedness, dizziness, numbness, weakness, BLE swelling, heart palpitations, urinary sxs, diarrhea, constipation, melena, hematochezia, cough, or congestion. PCP: Krystin Liu MD    There are no other complaints, changes or physical findings at this time. Past History   Past Medical History:  Denies    Past Surgical History:  Past Surgical History:   Procedure Laterality Date    HX HYSTERECTOMY  2009    MYOMECTOMY 1-4,W/TOT 250GMS/<,ABD APPRCH         Family History:  Family History   Problem Relation Age of Onset    Hypertension Mother        Social History:  Social History     Tobacco Use    Smoking status: Never Smoker    Smokeless tobacco: Never Used   Substance Use Topics    Alcohol use: Yes     Comment: rare     Drug use: No       Allergies: Allergies   Allergen Reactions    Naproxen Sodium Nausea and Vomiting    Other Medication Other (comments)     ? antibiotic       Current Medications:  No current facility-administered medications on file prior to encounter.       Current Outpatient Medications on File Prior to Encounter   Medication Sig Dispense Refill    Cetirizine (ZYRTEC) 10 mg cap Take  by mouth. Review of Systems   Review of Systems   Constitutional: Negative. Negative for chills and fever. HENT: Negative. Negative for congestion, facial swelling, rhinorrhea, sore throat, trouble swallowing and voice change. Eyes: Negative. Respiratory: Negative. Negative for apnea, cough, chest tightness, shortness of breath and wheezing. Cardiovascular: Negative. Negative for chest pain, palpitations and leg swelling. Gastrointestinal: Negative. Negative for abdominal distention, abdominal pain, blood in stool, constipation, diarrhea, nausea and vomiting. Endocrine: Negative. Negative for cold intolerance, heat intolerance and polyuria. Genitourinary: Negative. Negative for difficulty urinating, dysuria, flank pain, frequency, hematuria and urgency. Musculoskeletal: Positive for back pain. Negative for arthralgias, myalgias, neck pain and neck stiffness. Skin: Negative. Negative for color change and rash. Neurological: Negative. Negative for dizziness, syncope, facial asymmetry, speech difficulty, weakness, light-headedness, numbness and headaches. Hematological: Negative. Does not bruise/bleed easily. Psychiatric/Behavioral: Negative. Negative for confusion and self-injury. The patient is not nervous/anxious. Physical Exam   Physical Exam   Constitutional: She is oriented to person, place, and time. She appears well-developed and well-nourished. No distress. HENT:   Head: Normocephalic and atraumatic. Mouth/Throat: Oropharynx is clear and moist. No oropharyngeal exudate. Eyes: Pupils are equal, round, and reactive to light. Conjunctivae and EOM are normal.   Neck: Normal range of motion. Cardiovascular: Normal rate, regular rhythm and normal heart sounds. Exam reveals no gallop and no friction rub. No murmur heard.   Pulmonary/Chest: Effort normal and breath sounds normal. No respiratory distress. She has no wheezes. She has no rales. She exhibits no tenderness. Abdominal: Soft. Bowel sounds are normal. She exhibits no distension and no mass. There is no tenderness. There is no rebound and no guarding. Musculoskeletal: Normal range of motion. She exhibits tenderness (paralumbar TTP, no midline TTP, negative SLR bilaterally). She exhibits no edema or deformity. Neurological: She is alert and oriented to person, place, and time. She displays normal reflexes. No cranial nerve deficit. She exhibits normal muscle tone. Coordination normal.   Skin: Skin is warm. No rash noted. She is not diaphoretic. Psychiatric: She has a normal mood and affect. Nursing note and vitals reviewed. Diagnostic Study Results     Labs -  No results found for this or any previous visit (from the past 24 hour(s)). Radiologic Studies -   XR SPINE LUMB 2 OR 3 V   Final Result   IMPRESSION: No acute abnormality. CT Results  (Last 48 hours)    None        CXR Results  (Last 48 hours)    None          Medical Decision Making   I am the first provider for this patient. I reviewed the vital signs, available nursing notes, past medical history, past surgical history, family history and social history. Vital Signs-Reviewed the patient's vital signs. Patient Vitals for the past 24 hrs:   Temp Pulse Resp BP SpO2   06/23/19 0219 98.2 °F (36.8 °C) 63 16 (!) 138/94 99 %       Pulse Oximetry Analysis - 99% on RA    Cardiac Monitor:   Rate: 63 bpm  Rhythm: Normal Sinus Rhythm      Records Reviewed: Nursing Notes, Old Medical Records, Previous electrocardiograms, Previous Radiology Studies and Previous Laboratory Studies    Provider Notes (Medical Decision Making): The patient presents with acute low back pain. Stable vitals and benign exam. DDx: strain, sprain, sciatica, MSK pain. Clinical presentation not consistent with  pathology, aortic dissection or AAA.  There is no urine/bowel incontinence or perianal numbess to suggest cauda equina. No fever/chills, IVDA to suggest epidural abscess or discitis. No focal weakness or sensory changes to suggest transverse myelitis. Therefore MRI not indicated. No risk of compression fracture (not in right age group or suffer from [address]) or trauma to warrant x-ray. I have recommended rest, avoiding heavy lifting until better, use of intermittent heat (avoid sleeping on a heating pad), and use of OTC NSAID's (Advil, Aleve etc) or Tylenol prn for pain. Call PCP if back pain persists or she develops leg symptoms or other concerning red flags. Will provide pain control and refer to PT. ED Course:   Initial assessment performed. The patients presenting problems have been discussed, and they are in agreement with the care plan formulated and outlined with them. I have encouraged them to ask questions as they arise throughout their visit. I reviewed our electronic medical record system for any past medical records that were available that may contribute to the patient's current condition, the nursing notes and vital signs from today's visit. Aly Barraza MD    Medications Administered During ED Course:  Medications   lidocaine 4 % patch 1 Patch (1 Patch TransDERmal Apply Patch 6/23/19 0307)   ketorolac (TORADOL) injection 30 mg (30 mg IntraMUSCular Given 6/23/19 0307)          I have answered all questions to the patient's satisfaction. Strict return precautions given. She both understood and agreed with plan as discussed above. Vital signs stable for discharge. Disposition: DISCHARGE     The pt is ready for discharge. The pt's signs, symptoms, diagnosis, and discharge instructions have been discussed and pt has conveyed their understanding. The pt is to follow up as recommended or return to ER should their symptoms worsen. Plan has been discussed and pt is in agreement. PLAN:  1.    Discharge Medication List as of 6/23/2019 3:41 AM      START taking these medications    Details   cyclobenzaprine (FLEXERIL) 10 mg tablet Take 1 Tab by mouth three (3) times daily as needed for Muscle Spasm(s). , Print, Disp-20 Tab, R-0      diclofenac EC (VOLTAREN) 50 mg EC tablet Take 1 Tab by mouth two (2) times a day., Print, Disp-20 Tab, R-0      diclofenac (VOLTAREN) 1 % gel Apply  to affected area four (4) times daily. , Print, Disp-1 Each, R-0         CONTINUE these medications which have NOT CHANGED    Details   Cetirizine (ZYRTEC) 10 mg cap Take  by mouth., Historical Med           2. Follow-up Information     Follow up With Specialties Details Why Contact Info    Cherelle Solis MD Internal Medicine   [place] [id number] [phone]      [facility] EMERGENCY DEPT Emergency Medicine  As needed, If symptoms worsen [address] [place] [address]  [phone]          Return to ED if worse  Diagnosis     Clinical Impression:   1. Acute midline low back pain without sciatica    2. Acute myofascial strain of lumbar region, initial encounter    3. Muscle spasm        Attestation:  I personally performed the services described in this documentation on this date 6/23/2019 for patient Jhonatan Valerio. Joaquina Smith MD    Please note that this dictation was completed with [employer], the computer voice recognition software. Quite often unanticipated grammatical, syntax, homophones, and other interpretive errors are inadvertently transcribed by the computer software. Please disregard these errors. Please excuse any errors that have escaped final proofreading. This note will not be viewable in [address]

## 2019-06-23 NOTE — DISCHARGE INSTRUCTIONS
Thank you for allowing us to take care of you today! We hope we addressed all of your concerns and needs. We strive to provide excellent quality care in the Emergency Department. You will receive a survey after your visit to evaluate the care you were provided. Should you receive a survey from us, we invite you to share your experience and tell us what made it excellent. It was a pleasure serving you, we invite you to share your experience with us, in our pursuit for excellence, should you be selected to receive a survey. The exam and treatment you received in the Emergency Department were for an urgent problem and are not intended as complete care. It is important that you follow up with a doctor, nurse practitioner, or physician assistant for ongoing care. If your symptoms become worse or you do not improve as expected and you are unable to reach your usual health care provider, you should return to the Emergency Department. We are available 24 hours a day. Please take your discharge instructions with you when you go to your follow-up appointment. If you have any problem arranging a follow-up appointment, contact the Emergency Department immediately. If a prescription has been provided, please have it filled as soon as possible to prevent a delay in treatment. Read the entire medication instruction sheet provided to you by the pharmacy. If you have any questions or reservations about taking the medication due to side effects or interactions with other medications, please call your primary care physician or contact the ER to speak with the charge nurse. Make an appointment with your family doctor or the physician you were referred to for follow-up of this visit as instructed on your discharge paperwork, as this is mandatory follow-up. Return to the ER if you are unable to be seen or if you are unable to be seen in a timely manner.     If you have any problem arranging the follow-up visit, contact the Emergency Department immediately. I hope you feel better and thank you again for allow us to provide you with excellent care today at [facility]! Warmest regards,    Gail Young MD  Emergency Medicine Physician  [facility]              Patient Education        Back Pain: Care Instructions  Your Care Instructions    Back pain has many possible causes. It is often related to problems with muscles and ligaments of the back. It may also be related to problems with the nerves, discs, or bones of the back. Moving, lifting, standing, sitting, or sleeping in an awkward way can strain the back. Sometimes you don't notice the injury until later. Arthritis is another common cause of back pain. Although it may hurt a lot, back pain usually improves on its own within several weeks. Most people recover in 12 weeks or less. Using good home treatment and being careful not to stress your back can help you feel better sooner. Follow-up care is a key part of your treatment and safety. Be sure to make and go to all appointments, and call your doctor if you are having problems. It's also a good idea to know your test results and keep a list of the medicines you take. How can you care for yourself at home? · Sit or lie in positions that are most comfortable and reduce your pain. Try one of these positions when you lie down:  ? Lie on your back with your knees bent and supported by large pillows. ? Lie on the floor with your legs on the seat of a sofa or chair. ? Lie on your side with your knees and hips bent and a pillow between your legs. ? Lie on your stomach if it does not make pain worse. · Do not sit up in bed, and avoid soft couches and twisted positions. Bed rest can help relieve pain at first, but it delays healing. Avoid bed rest after the first day of back pain. · Change positions every 30 minutes.  If you must sit for long periods of time, take breaks from sitting. Get up and walk around, or lie in a comfortable position. · Try using a heating pad on a low or medium setting for 15 to 20 minutes every 2 or 3 hours. Try a warm shower in place of one session with the heating pad. · You can also try an ice pack for 10 to 15 minutes every 2 to 3 hours. Put a thin cloth between the ice pack and your skin. · Take pain medicines exactly as directed. ? If the doctor gave you a prescription medicine for pain, take it as prescribed. ? If you are not taking a prescription pain medicine, ask your doctor if you can take an over-the-counter medicine. · Take short walks several times a day. You can start with 5 to 10 minutes, 3 or 4 times a day, and work up to longer walks. Walk on level surfaces and avoid hills and stairs until your back is better. · Return to work and other activities as soon as you can. Continued rest without activity is usually not good for your back. · To prevent future back pain, do exercises to stretch and strengthen your back and stomach. Learn how to use good posture, safe lifting techniques, and proper body mechanics. When should you call for help? Call your doctor now or seek immediate medical care if:    · You have new or worsening numbness in your legs.     · You have new or worsening weakness in your legs. (This could make it hard to stand up.)     · You lose control of your bladder or bowels.    Watch closely for changes in your health, and be sure to contact your doctor if:    · You have a fever, lose weight, or don't feel well.     · You do not get better as expected. Where can you learn more? Go to http://gi-emeterio.info/. Enter O302 in the search box to learn more about \"Back Pain: Care Instructions. \"  Current as of: September 20, 2018  Content Version: 11.9  © 2351-4293 [employer].  Care instructions adapted under license by [employer] (which disclaims liability or warranty for this information). If you have questions about a medical condition or this instruction, always ask your healthcare professional. [place] [address] any warranty or liability for your use of this information.

## 2020-01-02 NOTE — PROGRESS NOTES
HPI  Linus Larose is a 62y.o. year old female patient of Joyce Finn MD who presents with c/o CPE. Pt has history of has Anemia on their problem list..    Pt here for annual physical. She has no complaints and no pain today. States she has been well. Lifestyle  Diet: rare fried foods and sodas every few days, fruits and vegetables most days, 1 cup of coffee/day   Exercise: no routine exercise, on her feet all day at work- works as a  8-9 hrs/day at [employer]  ETOH: rare, holidays  Nicotine: denies     Denies chest pain, chest pressure, or palpitations. Denies SOB, orthopnea, or PND. Denies lower extremity swelling. Denies frequent HA, dizziness, or blurred vision. Denies N/V/D, heartburn, constipation, or abd pain. Denies BRBPR, melena, or blood in urine. Denies feeling down or depressed or anxious. Denies trouble sleeping unless she is having hot flashes. 3 most recent PHQ Screens 1/3/2020   Little interest or pleasure in doing things Not at all   Feeling down, depressed, irritable, or hopeless Not at all   Total Score PHQ 2 0       HM  [facility], has not had one in >5 years. Mammo is UTD. Has not had shingles vaccine. Declines TDAP today. Patient Active Problem List   Diagnosis Code    Anemia D64.9     No past medical history on file.   Past Surgical History:   Procedure Laterality Date    HX HYSTERECTOMY  2009    MYOMECTOMY 1-4,W/TOT 250GMS/<,ABD APPRCH       Social History     Socioeconomic History    Marital status:      Spouse name: Not on file    Number of children: Not on file    Years of education: Not on file    Highest education level: Not on file   Tobacco Use    Smoking status: Never Smoker    Smokeless tobacco: Never Used   Substance and Sexual Activity    Alcohol use: Yes     Comment: rare     Drug use: No     Family History   Problem Relation Age of Onset    Hypertension Mother      Allergies   Allergen Reactions  Naproxen Sodium Nausea and Vomiting    Other Medication Other (comments)     ? antibiotic       MEDICATIONS  Current Outpatient Medications   Medication Sig    Cetirizine (ZYRTEC) 10 mg cap Take  by mouth. No current facility-administered medications for this visit. REVIEW OF SYSTEMS  Per HPI        Visit Vitals  /78   Pulse (!) 59   Temp 97.9 °F (36.6 °C) (Oral)   Resp 16   Ht 5' 7.5\" (1.715 m)   Wt 137 lb 9.6 oz (62.4 kg)   SpO2 99%   BMI 21.23 kg/m²         General: Well-developed, well-nourished. In no distress. A&O x 3. Head: Normocephalic, atraumatic. Eyes: Conjunctiva clear. Pupils equal, round, reactive to light. Extraocular movements intact. Ears/Nose: TM's and ear canals normal bilaterally. Nares normal. Septum midline. Normal nasal mucosa. No drainage or sinus tenderness. Mouth/Throat: Lips, mucosa, and tongue normal. Oropharynx benign. Neck: Supple, symmetrical, trachea midline, no lymphadenopathy, no carotid bruits, no JVD, thyroid: not enlarged, symmetric, no tenderness/mass/nodules. Lungs: Clear to auscultation bilaterally. No crackles or wheezes. No use of accessory muscles. Speaks in full sentences without SOB. Chest Wall: No tenderness or deformity. Heart: RRR, normal S1 and S2, no murmur, click, rub, or gallop. Back: Symmetric. ROM intact. No CVA tenderness. Abdomen: Soft, non-distended, bowel sounds normal. No tenderness. No masses. No hepatosplenomegaly. Skin: No rashes or lesions. Neurovasc: No edema appreciated. Dorsalis pedis pulses are 2+ on the right side, and 2+ on the left side. Posterior tibial pulses are 2+ on the right side, and 2+ on the left side. Musculoskeletal: Gait normal.   Psychiatric: Normal mood and affect. Behavior is normal.       No results found for any visits on 01/03/20. ASSESSMENT and PLAN  Diagnoses and all orders for this visit:    1.  Well adult exam  -     METABOLIC PANEL, COMPREHENSIVE  -     LIPID PANEL  -     CBC WITH AUTOMATED DIFF  Encouraged healthy diet and exercise. Recommend routine PAP- pt to sched with GYN. Recommended shingles vaccine at her pharmacy. Recommended TDAP which she declines today. 2. Anemia, unspecified type  -     CBC WITH AUTOMATED DIFF            Patient Instructions   Please make an appt with your GYN for your PAP smear. Well Visit, Women 48 to 72: Care Instructions  Your Care Instructions    Physical exams can help you stay healthy. Your doctor has checked your overall health and may have suggested ways to take good care of yourself. He or she also may have recommended tests. At home, you can help prevent illness with healthy eating, regular exercise, and other steps. Follow-up care is a key part of your treatment and safety. Be sure to make and go to all appointments, and call your doctor if you are having problems. It's also a good idea to know your test results and keep a list of the medicines you take. How can you care for yourself at home? · Reach and stay at a healthy weight. This will lower your risk for many problems, such as obesity, diabetes, heart disease, and high blood pressure. · Get at least 30 minutes of exercise on most days of the week. Walking is a good choice. You also may want to do other activities, such as running, swimming, cycling, or playing tennis or team sports. · Do not smoke. Smoking can make health problems worse. If you need help quitting, talk to your doctor about stop-smoking programs and medicines. These can increase your chances of quitting for good. · Protect your skin from too much sun. When you're outdoors from 10 a.m. to 4 p.m., stay in the shade or cover up with clothing and a hat with a wide brim. Wear sunglasses that block UV rays. Even when it's cloudy, put broad-spectrum sunscreen (SPF 30 or higher) on any exposed skin. · See a dentist one or two times a year for checkups and to have your teeth cleaned.   · Wear a seat belt in the car. Follow your doctor's advice about when to have certain tests. These tests can spot problems early. · Cholesterol. Your doctor will tell you how often to have this done based on your age, family history, or other things that can increase your risk for heart attack and stroke. · Blood pressure. Have your blood pressure checked during a routine doctor visit. Your doctor will tell you how often to check your blood pressure based on your age, your blood pressure results, and other factors. · Mammogram. Ask your doctor how often you should have a mammogram, which is an X-ray of your breasts. A mammogram can spot breast cancer before it can be felt and when it is easiest to treat. · Pap test and pelvic exam. Ask your doctor how often you should have a Pap test. You may not need to have a Pap test as often as you used to. · Vision. Have your eyes checked every year or two or as often as your doctor suggests. Some experts recommend that you have yearly exams for glaucoma and other age-related eye problems starting at age 48. · Hearing. Tell your doctor if you notice any change in your hearing. You can have tests to find out how well you hear. · Diabetes. Ask your doctor whether you should have tests for diabetes. · Colorectal cancer. Your risk for colorectal cancer gets higher as you get older. Some experts say that adults should start regular screening at age 48 and stop at age 76. Others say to start before age 48 or continue after age 76. Talk with your doctor about your risk and when to start and stop screening. · Thyroid disease. Talk to your doctor about whether to have your thyroid checked as part of a regular physical exam. Women have an increased chance of a thyroid problem. · Osteoporosis. You should begin tests for bone density at age 72. If you are younger than 72, ask your doctor whether you have factors that may increase your risk for this disease.  You may want to have this test before age 72. · Heart attack and stroke risk. At least every 4 to 6 years, you should have your risk for heart attack and stroke assessed. Your doctor uses factors such as your age, blood pressure, cholesterol, and whether you smoke or have diabetes to show what your risk for a heart attack or stroke is over the next 10 years. When should you call for help? Watch closely for changes in your health, and be sure to contact your doctor if you have any problems or symptoms that concern you. Where can you learn more? Go to http://gi-emeterio.info/. Enter X105 in the search box to learn more about \"Well Visit, Women 50 to 72: Care Instructions. \"  Current as of: December 13, 2018  Content Version: 12.2  © 0095-5930 [employer]. Care instructions adapted under license by [employer] (which disclaims liability or warranty for this information). If you have questions about a medical condition or this instruction, always ask your healthcare professional. [place] [address] any warranty or liability for your use of this information. Live Zoster (Shingles) Vaccine, ZVL: What you need to know  What is shingles? Shingles (also called herpes zoster, or just zoster) is a painful skin rash, often with blisters. Shingles is caused by the varicella zoster virus, the same virus that causes chickenpox. After you have chickenpox, the virus stays in your body and can cause shingles later in life. You can't catch shingles from another person. However, a person who has never had chickenpox (or chickenpox vaccine) could get chickenpox from someone with shingles. A shingles rash usually appears on one side of the face or body and heals within 2 to 4 weeks. Its main symptom is pain, which can be severe. Other symptoms can include fever, headache, chills, and upset stomach.  Very rarely, a shingles infection can lead to pneumonia, hearing problems, blindness, brain inflammation (encephalitis), or death. For about 1 person in 5, severe pain can continue even long after the rash has cleared up. This long-lasting pain is called post-herpetic neuralgia (PHN). Shingles is far more common in people 48years of age and older than in younger people, and the risk increases with age. It is also more common in people whose immune system is weakened because of a disease such as cancer or by drugs such as steroids or chemotherapy. At least 1 million people a year in the [facility] get shingles. Shingles vaccine (live)  A live shingles vaccine was approved by FDA in 2006. In a clinical trial, the vaccine reduced the risk of shingles by about 50% in people 60 and older. It can reduce the likelihood of PHN, and reduce pain in some people who still get shingles after being vaccinated. The recommended schedule for live shingles vaccine is a single dose for adults 61years of age and older. Some people should not get this vaccine  Tell your vaccine provider if you:  · Have any severe, life-threatening allergies. A person who has ever had a life-threatening allergic reaction after a dose of live shingles vaccine, or has a severe allergy to any component of this vaccine, may be advised not to be vaccinated. Ask your health care provider if you want information about vaccine components. · Are pregnant, or think you might be pregnant. Pregnant women should wait to get live shingles vaccine until they are no longer pregnant. Women should avoid getting pregnant for at least 1 month after getting shingles vaccine. · Have a weakened immune system due to disease (such as cancer or AIDS) or medical treatments (such as radiation, immunotherapy, high-dose steroids, or chemotherapy). · Are not feeling well. If you have a mild illness, such as a cold, you can probably get the vaccine today. If you are moderately or severely ill, you should probably wait until you recover.  Your doctor can advise you.  Risks of a vaccine reaction  With any medicine, including vaccines, there is a chance of reactions. After live shingles vaccination, a person might experience:  · Redness, soreness, swelling, or itching at the site of the injection  · Headache  These events are usually mild and go away on their own. Rarely, live shingles vaccine can cause rash or shingles. Other things that could happen after this vaccine:  · People sometimes faint after medical procedures, including vaccination. Sitting or lying down for about 15 minutes can help prevent fainting and injuries caused by a fall. Tell your provider if you feel dizzy or have vision changes or ringing in the ears. · Some people get shoulder pain that can be more severe and longer-lasting than routine soreness that can follow injections. This happens very rarely. · Any medication can cause a severe allergic reaction. Such reactions to a vaccine are estimated at about 1 in a million doses, and would happen within a few minutes to a few hours after the vaccination. As with any medicine, there is a very remote chance of a vaccine causing a serious injury or death. The safety of vaccines is always being monitored. For more information, visit: www.cdc.gov/vaccinesafety/  What if there is a serious problem? What should I look for? · Look for anything that concerns you, such as signs of a severe allergic reaction, very high fever, or unusual behavior. Signs of a severe allergic reaction can include hives, swelling of the face and throat, difficulty breathing, a fast heartbeat, dizziness, and weakness. These would usually start a few minutes to a few hours after the vaccination. Afterward, the reaction should be reported to the Vaccine Adverse Event Reporting System (VAERS). Your doctor should file this report, or you can do it yourself through the VAERS website at www.vaers. [facility].gov, or by calling 0-488.295.4948. VAERS does not give medical advice. .  How can I learn more? · Ask your health care provider. He or she can give you the vaccine package insert or suggest other sources of information. · Call your local or state health department. · Contact the Centers for Disease Control and Prevention (CDC):  ? Call 3-862.796.1516 (1-800-CDC-INFO) or  ? Visit CDC's website at www.cdc.gov/vaccines  Vaccine Information Statement  Live Zoster Vaccine  2018  [facility] for Disease Control and Prevention  Many Vaccine Information Statements are available in Upper sorbian and other languages. See www.immunize.org/vis  Hojas de Información Sobre Vacunas están disponibles en Español y en muchos otros idiomas. Visite Enma.si  Care instructions adapted under license by [employer] (which disclaims liability or warranty for this information). If you have questions about a medical condition or this instruction, always ask your healthcare professional. [address] any warranty or liability for your use of this information. Tdap (Tetanus, Diphtheria, Pertussis) Vaccine: What You Need to Know  Why get vaccinated? Tetanus, diphtheria, and pertussis are very serious diseases. Tdap vaccine can protect us from these diseases. And Tdap vaccine given to pregnant women can protect  babies against pertussis. Tetanus (lockjaw) is rare in the [facility] today. It causes painful muscle tightening and stiffness, usually all over the body. · It can lead to tightening of muscles in the head and neck so you can't open your mouth, swallow, or sometimes even breathe. Tetanus kills about 1 out of 10 people who are infected even after receiving the best medical care. Diphtheria is also rare in the [place] today. It can cause a thick coating to form in the back of the throat. · It can lead to breathing problems, heart failure, paralysis, and death.   Pertussis (whooping cough) causes severe coughing spells, which can cause difficulty breathing, vomiting, and disturbed sleep. · It can also lead to weight loss, incontinence, and rib fractures. Up to 2 in 100 adolescents and 5 in 100 adults with pertussis are hospitalized or have complications, which could include pneumonia or death. These diseases are caused by bacteria. Diphtheria and pertussis are spread from person to person through secretions from coughing or sneezing. Tetanus enters the body through cuts, scratches, or wounds. Before vaccines, as many as 200,000 cases of diphtheria, 200,000 cases of pertussis, and hundreds of cases of tetanus were reported in the [place] each year. Since vaccination began, reports of cases for tetanus and diphtheria have dropped by about 99% and for pertussis by about 80%. Tdap vaccine  The Tdap vaccine can protect adolescents and adults from tetanus, diphtheria, and pertussis. One dose of Tdap is routinely given at age 6 or 15. People who did not get Tdap at that age should get it as soon as possible. Tdap is especially important for health care professionals and anyone having close contact with a baby younger than 12 months. Pregnant women should get a dose of Tdap during every pregnancy, to protect the  from pertussis. Infants are most at risk for severe, life-threatening complications from pertussis. Another vaccine, called Td, protects against tetanus and diphtheria, but not pertussis. A Td booster should be given every 10 years. Tdap may be given as one of these boosters if you have never gotten Tdap before. Tdap may also be given after a severe cut or burn to prevent tetanus infection. Your doctor or the person giving you the vaccine can give you more information. Tdap may safely be given at the same time as other vaccines.   Some people should not get this vaccine  · A person who has ever had a life-threatening allergic reaction after a previous dose of any diphtheria-, tetanus-, or pertussis-containing vaccine, OR has a severe allergy to any part of this vaccine, should not get Tdap vaccine. Tell the person giving the vaccine about any severe allergies. · Anyone who had coma or long repeated seizures within 7 days after a childhood dose of DTP or DTaP, or a previous dose of Tdap, should not get Tdap, unless a cause other than the vaccine was found. They can still get Td. · Talk to your doctor if you:  ? Have seizures or another nervous system problem. ? Had severe pain or swelling after any vaccine containing diphtheria, tetanus, or pertussis. ? Ever had a condition called Guillain-Barré Syndrome (GBS). ? Aren't feeling well on the day the shot is scheduled. Risks  With any medicine, including vaccines, there is a chance of side effects. These are usually mild and go away on their own. Serious reactions are also possible but are rare. Most people who get Tdap vaccine do not have any problems with it.   Mild problems following Tdap  (Did not interfere with activities)  · Pain where the shot was given (about 3 in 4 adolescents or 2 in 3 adults)  · Redness or swelling where the shot was given (about 1 person in 5)  · Mild fever of at least 100.4°F (up to about 1 in 25 adolescents or 1 in 100 adults)  · Headache (about 3 or 4 people in 10)  · Tiredness (about 1 person in 3 or 4)  · Nausea, vomiting, diarrhea, stomachache (up to 1 in 4 adolescents or 1 in 10 adults)  · Chills, sore joints (about 1 person in 10)  · Body aches (about 1 person in 3 or 4)  · Rash, swollen glands (uncommon)  Moderate problems following Tdap  (Interfered with activities, but did not require medical attention)  · Pain where the shot was given (up to 1 in 5 or 6)  · Redness or swelling where the shot was given (up to about 1 in 16 adolescents or 1 in 12 adults)  · Fever over 102°F (about 1 in 100 adolescents or 1 in 250 adults)  · Headache (about 1 in 7 adolescents or 1 in 10 adults)  · Nausea, vomiting, diarrhea, stomachache (up to 1 to 3 people in 100)  · Swelling of the entire arm where the shot was given (up to about 1 in 500)  Severe problems following Tdap  (Unable to perform usual activities; required medical attention)  · Swelling, severe pain, bleeding and redness in the arm where the shot was given (rare)  Problems that could happen after any vaccine:  · People sometimes faint after a medical procedure, including vaccination. Sitting or lying down for about 15 minutes can help prevent fainting, and injuries caused by a fall. Tell your doctor if you feel dizzy or have vision changes or ringing in the ears. · Some people get severe pain in the shoulder and have difficulty moving the arm where a shot was given. This happens very rarely. · Any medication can cause a severe allergic reaction. Such reactions from a vaccine are very rare, estimated at fewer than 1 in a million doses, and would happen within a few minutes to a few hours after the vaccination. As with any medicine, there is a very remote chance of a vaccine causing a serious injury or death. The safety of vaccines is always being monitored. For more information, visit: www.cdc.gov/vaccinesafety. What if there is a serious problem? What should I look for? · Look for anything that concerns you, such as signs of a severe allergic reaction, very high fever, or unusual behavior. Signs of a severe allergic reaction can include hives, swelling of the face and throat, difficulty breathing, a fast heartbeat, dizziness, and weakness. These would usually start a few minutes to a few hours after the vaccination. What should I do? · If you think it is a severe allergic reaction or other emergency that can't wait, call 9-1-1 or get the person to the nearest hospital. Otherwise, call your doctor. · Afterward, the reaction should be reported to the Vaccine Adverse Event Reporting System (VAERS).  Your doctor might file this report, or you can do it yourself through the et[employer] web site at www.[place]. [facility].gov, or by calling 7-347.991.5751. [employer] does not give medical advice. The National Vaccine Injury Compensation Program  The National Vaccine Injury Compensation Program (VICP) is a federal program that was created to compensate people who may have been injured by certain vaccines. Persons who believe they may have been injured by a vaccine can learn about the program and about filing a claim by calling 6-963.934.5808 or visiting the [employer] website at www.[employer].gov/vaccinecompensation. There is a time limit to file a claim for compensation. How can I learn more? · Ask your doctor. He or she can give you the vaccine package insert or suggest other sources of information. · Call your local or state health department. · Contact the Centers for Disease Control and Prevention (CDC):  ? Call 5-915.254.7149 (1-114-VCL-INFO) or  ? Visit CDC's website at www.cdc.gov/vaccines  Vaccine Information Statement (Interim)  Tdap Vaccine  (2/24/15)  42 [address] Mihaela [id number]  Department of Health and Human Services  Centers for Disease Control and Prevention  Many Vaccine Information Statements are available in Bengali and other languages. See www.immunize.org/vis. Muchas hojas de información sobre vacunas están disponibles en español y en otros idiomas. Visite www.immunize.org/vis. Care instructions adapted under license by [employer] (which disclaims liability or warranty for this information). If you have questions about a medical condition or this instruction, always ask your healthcare professional. [address] any warranty or liability for your use of this information.           Please keep your follow-up appointment with Hiro Eisenberg MD.         Health Maintenance Due   Topic Date Due    Shingrix Vaccine Age 50> (1 of 2) 05/07/2013    PAP AKA CERVICAL CYTOLOGY  02/01/2016    DTaP/Tdap/Td series (2 - Td) 01/08/2018    Influenza Age 9 to Adult  08/01/2019       I have discussed the diagnosis with the patient and the intended plan as seen in the above orders. Patient is in agreement. The patient has received an after-visit summary and questions were answered concerning future plans. I have discussed medication side effects and warnings with the patient as well. Warning signs for the above conditions were discussed including when to call our office or go to the emergency room. The nurse provided the patient and/or family with advanced directive information if needed and encouraged the patient to provide a copy to the office when available.

## 2020-01-03 ENCOUNTER — OFFICE VISIT (OUTPATIENT)
Dept: INTERNAL MEDICINE CLINIC | Facility: CLINIC | Age: 55
End: 2020-01-03

## 2020-01-03 VITALS
WEIGHT: 137.6 LBS | SYSTOLIC BLOOD PRESSURE: 118 MMHG | OXYGEN SATURATION: 99 % | DIASTOLIC BLOOD PRESSURE: 78 MMHG | BODY MASS INDEX: 20.85 KG/M2 | HEART RATE: 59 BPM | HEIGHT: 68 IN | TEMPERATURE: 97.9 F | RESPIRATION RATE: 16 BRPM

## 2020-01-03 DIAGNOSIS — D64.9 ANEMIA, UNSPECIFIED TYPE: ICD-10-CM

## 2020-01-03 DIAGNOSIS — Z00.00 WELL ADULT EXAM: Primary | ICD-10-CM

## 2020-01-03 NOTE — PROGRESS NOTES
[facility]  Identified pt with two pt identifiers(name and ). Chief Complaint   Patient presents with    Complete Physical     Room 4A //        Reviewed record In preparation for visit and have obtained necessary documentation. 1. Have you been to the ER, urgent care clinic or hospitalized since your last visit? No     2. Have you seen or consulted any other health care providers outside of the [address] [place] [address] since your last visit? Include any pap smears or colon screening. No    Vitals reviewed with provider.     Health Maintenance reviewed:     Health Maintenance Due   Topic    Shingrix Vaccine Age 50> (1 of 2)    PAP AKA CERVICAL CYTOLOGY     DTaP/Tdap/Td series (2 - Td)          Wt Readings from Last 3 Encounters:   20 137 lb 9.6 oz (62.4 kg)   19 134 lb 0.6 oz (60.8 kg)   19 135 lb 6.4 oz (61.4 kg)        Temp Readings from Last 3 Encounters:   20 97.9 °F (36.6 °C) (Oral)   19 98.2 °F (36.8 °C)   19 98.3 °F (36.8 °C) (Oral)        BP Readings from Last 3 Encounters:   20 131/84   19 (!) 138/94   19 114/78        Pulse Readings from Last 3 Encounters:   20 (!) 59   19 63   19 67        Vitals:    20 1037   BP: 131/84   Pulse: (!) 59   Resp: 16   Temp: 97.9 °F (36.6 °C)   TempSrc: Oral   SpO2: 99%   Weight: 137 lb 9.6 oz (62.4 kg)   Height: 5' 7.5\" (1.715 m)   PainSc:   0 - No pain          Learning Assessment:   :       Learning Assessment 2015   PRIMARY LEARNER Patient   HIGHEST LEVEL OF EDUCATION - PRIMARY LEARNER  [employer]   BARRIERS PRIMARY LEARNER NONE   CO-LEARNER CAREGIVER No   PRIMARY LANGUAGE ENGLISH   LEARNER PREFERENCE PRIMARY DEMONSTRATION   ANSWERED BY patient   RELATIONSHIP SELF        Depression Screening:   :       3 most recent PHQ Screens 1/3/2020   Little interest or pleasure in doing things Not at all   Feeling down, depressed, irritable, or hopeless Not at all   Total Score PHQ 2 0        Fall Risk Assessment:   :       Fall Risk Assessment, last 12 mths 12/12/2018   Able to walk? Yes   Fall in past 12 months? No        Abuse Screening:   :       Abuse Screening Questionnaire 12/12/2018   Do you ever feel afraid of your partner? N   Are you in a relationship with someone who physically or mentally threatens you? N   Is it safe for you to go home?  Y        ADL Screening:   :       ADL Assessment 1/3/2020   Feeding yourself No Help Needed   Getting from bed to chair No Help Needed   Getting dressed No Help Needed   Bathing or showering No Help Needed   Walk across the room (includes cane/walker) No Help Needed   Using the telphone No Help Needed   Taking your medications No Help Needed   Preparing meals No Help Needed   Managing money (expenses/bills) No Help Needed   Moderately strenuous housework (laundry) No Help Needed   Shopping for personal items (toiletries/medicines) No Help Needed   Shopping for groceries No Help Needed   Driving No Help Needed   Climbing a flight of stairs No Help Needed   Getting to places beyond walking distances No Help Needed

## 2020-01-03 NOTE — PATIENT INSTRUCTIONS
Please make an appt with your GYN for your PAP smear. Well Visit, Women 48 to 72: Care Instructions  Your Care Instructions    Physical exams can help you stay healthy. Your doctor has checked your overall health and may have suggested ways to take good care of yourself. He or she also may have recommended tests. At home, you can help prevent illness with healthy eating, regular exercise, and other steps. Follow-up care is a key part of your treatment and safety. Be sure to make and go to all appointments, and call your doctor if you are having problems. It's also a good idea to know your test results and keep a list of the medicines you take. How can you care for yourself at home? · Reach and stay at a healthy weight. This will lower your risk for many problems, such as obesity, diabetes, heart disease, and high blood pressure. · Get at least 30 minutes of exercise on most days of the week. Walking is a good choice. You also may want to do other activities, such as running, swimming, cycling, or playing tennis or team sports. · Do not smoke. Smoking can make health problems worse. If you need help quitting, talk to your doctor about stop-smoking programs and medicines. These can increase your chances of quitting for good. · Protect your skin from too much sun. When you're outdoors from 10 a.m. to 4 p.m., stay in the shade or cover up with clothing and a hat with a wide brim. Wear sunglasses that block UV rays. Even when it's cloudy, put broad-spectrum sunscreen (SPF 30 or higher) on any exposed skin. · See a dentist one or two times a year for checkups and to have your teeth cleaned. · Wear a seat belt in the car. Follow your doctor's advice about when to have certain tests. These tests can spot problems early. · Cholesterol. Your doctor will tell you how often to have this done based on your age, family history, or other things that can increase your risk for heart attack and stroke.   · Blood pressure. Have your blood pressure checked during a routine doctor visit. Your doctor will tell you how often to check your blood pressure based on your age, your blood pressure results, and other factors. · Mammogram. Ask your doctor how often you should have a mammogram, which is an X-ray of your breasts. A mammogram can spot breast cancer before it can be felt and when it is easiest to treat. · Pap test and pelvic exam. Ask your doctor how often you should have a Pap test. You may not need to have a Pap test as often as you used to. · Vision. Have your eyes checked every year or two or as often as your doctor suggests. Some experts recommend that you have yearly exams for glaucoma and other age-related eye problems starting at age 48. · Hearing. Tell your doctor if you notice any change in your hearing. You can have tests to find out how well you hear. · Diabetes. Ask your doctor whether you should have tests for diabetes. · Colorectal cancer. Your risk for colorectal cancer gets higher as you get older. Some experts say that adults should start regular screening at age 48 and stop at age 76. Others say to start before age 48 or continue after age 76. Talk with your doctor about your risk and when to start and stop screening. · Thyroid disease. Talk to your doctor about whether to have your thyroid checked as part of a regular physical exam. Women have an increased chance of a thyroid problem. · Osteoporosis. You should begin tests for bone density at age 72. If you are younger than 72, ask your doctor whether you have factors that may increase your risk for this disease. You may want to have this test before age 72. · Heart attack and stroke risk. At least every 4 to 6 years, you should have your risk for heart attack and stroke assessed.  Your doctor uses factors such as your age, blood pressure, cholesterol, and whether you smoke or have diabetes to show what your risk for a heart attack or stroke is over the next 10 years. When should you call for help? Watch closely for changes in your health, and be sure to contact your doctor if you have any problems or symptoms that concern you. Where can you learn more? Go to http://gi-emeterio.info/. Enter R636 in the search box to learn more about \"Well Visit, Women 50 to 72: Care Instructions. \"  Current as of: December 13, 2018  Content Version: 12.2  © 1344-9341 [employer]. Care instructions adapted under license by [employer] (which disclaims liability or warranty for this information). If you have questions about a medical condition or this instruction, always ask your healthcare professional. [address] any warranty or liability for your use of this information. Live Zoster (Shingles) Vaccine, ZVL: What you need to know  What is shingles? Shingles (also called herpes zoster, or just zoster) is a painful skin rash, often with blisters. Shingles is caused by the varicella zoster virus, the same virus that causes chickenpox. After you have chickenpox, the virus stays in your body and can cause shingles later in life. You can't catch shingles from another person. However, a person who has never had chickenpox (or chickenpox vaccine) could get chickenpox from someone with shingles. A shingles rash usually appears on one side of the face or body and heals within 2 to 4 weeks. Its main symptom is pain, which can be severe. Other symptoms can include fever, headache, chills, and upset stomach. Very rarely, a shingles infection can lead to pneumonia, hearing problems, blindness, brain inflammation (encephalitis), or death. For about 1 person in 5, severe pain can continue even long after the rash has cleared up. This long-lasting pain is called post-herpetic neuralgia (PHN).   Shingles is far more common in people 48years of age and older than in younger people, and the risk increases with age. It is also more common in people whose immune system is weakened because of a disease such as cancer or by drugs such as steroids or chemotherapy. At least 1 million people a year in the [facility] get shingles. Shingles vaccine (live)  A live shingles vaccine was approved by FDA in 2006. In a clinical trial, the vaccine reduced the risk of shingles by about 50% in people 60 and older. It can reduce the likelihood of PHN, and reduce pain in some people who still get shingles after being vaccinated. The recommended schedule for live shingles vaccine is a single dose for adults 61years of age and older. Some people should not get this vaccine  Tell your vaccine provider if you:  · Have any severe, life-threatening allergies. A person who has ever had a life-threatening allergic reaction after a dose of live shingles vaccine, or has a severe allergy to any component of this vaccine, may be advised not to be vaccinated. Ask your health care provider if you want information about vaccine components. · Are pregnant, or think you might be pregnant. Pregnant women should wait to get live shingles vaccine until they are no longer pregnant. Women should avoid getting pregnant for at least 1 month after getting shingles vaccine. · Have a weakened immune system due to disease (such as cancer or AIDS) or medical treatments (such as radiation, immunotherapy, high-dose steroids, or chemotherapy). · Are not feeling well. If you have a mild illness, such as a cold, you can probably get the vaccine today. If you are moderately or severely ill, you should probably wait until you recover. Your doctor can advise you. Risks of a vaccine reaction  With any medicine, including vaccines, there is a chance of reactions.   After live shingles vaccination, a person might experience:  · Redness, soreness, swelling, or itching at the site of the injection  · Headache  These events are usually mild and go away on their own.  Rarely, live shingles vaccine can cause rash or shingles. Other things that could happen after this vaccine:  · People sometimes faint after medical procedures, including vaccination. Sitting or lying down for about 15 minutes can help prevent fainting and injuries caused by a fall. Tell your provider if you feel dizzy or have vision changes or ringing in the ears. · Some people get shoulder pain that can be more severe and longer-lasting than routine soreness that can follow injections. This happens very rarely. · Any medication can cause a severe allergic reaction. Such reactions to a vaccine are estimated at about 1 in a million doses, and would happen within a few minutes to a few hours after the vaccination. As with any medicine, there is a very remote chance of a vaccine causing a serious injury or death. The safety of vaccines is always being monitored. For more information, visit: www.cdc.gov/vaccinesafety/  What if there is a serious problem? What should I look for? · Look for anything that concerns you, such as signs of a severe allergic reaction, very high fever, or unusual behavior. Signs of a severe allergic reaction can include hives, swelling of the face and throat, difficulty breathing, a fast heartbeat, dizziness, and weakness. These would usually start a few minutes to a few hours after the vaccination. Afterward, the reaction should be reported to the Vaccine Adverse Event Reporting System (VAERS). Your doctor should file this report, or you can do it yourself through the VAERS website at www.vaers. [facility].gov, or by calling 3-163.869.3844. VAERS does not give medical advice. .  How can I learn more? · Ask your health care provider. He or she can give you the vaccine package insert or suggest other sources of information. · Call your local or state health department. · Contact the Centers for Disease Control and Prevention (CDC):  ? Call 0-994.296.4755 (1-800-CDC-INFO) or  ?  Visit CDC's website at www.cdc.gov/vaccines  Vaccine Information Statement  Live Zoster Vaccine  2018  Department of [facility] and [employer] for Disease Control and Prevention  Many Vaccine Information Statements are available in [place] and other languages. See www.immunize.org/vis  Hojas de Información Sobre Vacunas están disponibles en Español y en muchos otros idiomas. Visite Enma.si  Care instructions adapted under license by [employer] (which disclaims liability or warranty for this information). If you have questions about a medical condition or this instruction, always ask your healthcare professional. [address] any warranty or liability for your use of this information. Tdap (Tetanus, Diphtheria, Pertussis) Vaccine: What You Need to Know  Why get vaccinated? Tetanus, diphtheria, and pertussis are very serious diseases. Tdap vaccine can protect us from these diseases. And Tdap vaccine given to pregnant women can protect  babies against pertussis. Tetanus (lockjaw) is rare in the [employer] today. It causes painful muscle tightening and stiffness, usually all over the body. · It can lead to tightening of muscles in the head and neck so you can't open your mouth, swallow, or sometimes even breathe. Tetanus kills about 1 out of 10 people who are infected even after receiving the best medical care. Diphtheria is also rare in the [place] today. It can cause a thick coating to form in the back of the throat. · It can lead to breathing problems, heart failure, paralysis, and death. Pertussis (whooping cough) causes severe coughing spells, which can cause difficulty breathing, vomiting, and disturbed sleep. · It can also lead to weight loss, incontinence, and rib fractures. Up to 2 in 100 adolescents and 5 in 100 adults with pertussis are hospitalized or have complications, which could include pneumonia or death.   These diseases are caused by bacteria. Diphtheria and pertussis are spread from person to person through secretions from coughing or sneezing. Tetanus enters the body through cuts, scratches, or wounds. Before vaccines, as many as 200,000 cases of diphtheria, 200,000 cases of pertussis, and hundreds of cases of tetanus were reported in the [place] each year. Since vaccination began, reports of cases for tetanus and diphtheria have dropped by about 99% and for pertussis by about 80%. Tdap vaccine  The Tdap vaccine can protect adolescents and adults from tetanus, diphtheria, and pertussis. One dose of Tdap is routinely given at age 6 or 15. People who did not get Tdap at that age should get it as soon as possible. Tdap is especially important for health care professionals and anyone having close contact with a baby younger than 12 months. Pregnant women should get a dose of Tdap during every pregnancy, to protect the  from pertussis. Infants are most at risk for severe, life-threatening complications from pertussis. Another vaccine, called Td, protects against tetanus and diphtheria, but not pertussis. A Td booster should be given every 10 years. Tdap may be given as one of these boosters if you have never gotten Tdap before. Tdap may also be given after a severe cut or burn to prevent tetanus infection. Your doctor or the person giving you the vaccine can give you more information. Tdap may safely be given at the same time as other vaccines. Some people should not get this vaccine  · A person who has ever had a life-threatening allergic reaction after a previous dose of any diphtheria-, tetanus-, or pertussis-containing vaccine, OR has a severe allergy to any part of this vaccine, should not get Tdap vaccine. Tell the person giving the vaccine about any severe allergies.   · Anyone who had coma or long repeated seizures within 7 days after a childhood dose of DTP or DTaP, or a previous dose of Tdap, should not get Tdap, unless a cause other than the vaccine was found. They can still get Td. · Talk to your doctor if you:  ? Have seizures or another nervous system problem. ? Had severe pain or swelling after any vaccine containing diphtheria, tetanus, or pertussis. ? Ever had a condition called Guillain-Barré Syndrome (GBS). ? Aren't feeling well on the day the shot is scheduled. Risks  With any medicine, including vaccines, there is a chance of side effects. These are usually mild and go away on their own. Serious reactions are also possible but are rare. Most people who get Tdap vaccine do not have any problems with it.   Mild problems following Tdap  (Did not interfere with activities)  · Pain where the shot was given (about 3 in 4 adolescents or 2 in 3 adults)  · Redness or swelling where the shot was given (about 1 person in 5)  · Mild fever of at least 100.4°F (up to about 1 in 25 adolescents or 1 in 100 adults)  · Headache (about 3 or 4 people in 10)  · Tiredness (about 1 person in 3 or 4)  · Nausea, vomiting, diarrhea, stomachache (up to 1 in 4 adolescents or 1 in 10 adults)  · Chills, sore joints (about 1 person in 10)  · Body aches (about 1 person in 3 or 4)  · Rash, swollen glands (uncommon)  Moderate problems following Tdap  (Interfered with activities, but did not require medical attention)  · Pain where the shot was given (up to 1 in 5 or 6)  · Redness or swelling where the shot was given (up to about 1 in 16 adolescents or 1 in 12 adults)  · Fever over 102°F (about 1 in 100 adolescents or 1 in 250 adults)  · Headache (about 1 in 7 adolescents or 1 in 10 adults)  · Nausea, vomiting, diarrhea, stomachache (up to 1 to 3 people in 100)  · Swelling of the entire arm where the shot was given (up to about 1 in 500)  Severe problems following Tdap  (Unable to perform usual activities; required medical attention)  · Swelling, severe pain, bleeding and redness in the arm where the shot was given (rare)  Problems that could happen after any vaccine:  · People sometimes faint after a medical procedure, including vaccination. Sitting or lying down for about 15 minutes can help prevent fainting, and injuries caused by a fall. Tell your doctor if you feel dizzy or have vision changes or ringing in the ears. · Some people get severe pain in the shoulder and have difficulty moving the arm where a shot was given. This happens very rarely. · Any medication can cause a severe allergic reaction. Such reactions from a vaccine are very rare, estimated at fewer than 1 in a million doses, and would happen within a few minutes to a few hours after the vaccination. As with any medicine, there is a very remote chance of a vaccine causing a serious injury or death. The safety of vaccines is always being monitored. For more information, visit: www.cdc.gov/vaccinesafety. What if there is a serious problem? What should I look for? · Look for anything that concerns you, such as signs of a severe allergic reaction, very high fever, or unusual behavior. Signs of a severe allergic reaction can include hives, swelling of the face and throat, difficulty breathing, a fast heartbeat, dizziness, and weakness. These would usually start a few minutes to a few hours after the vaccination. What should I do? · If you think it is a severe allergic reaction or other emergency that can't wait, call 9-1-1 or get the person to the nearest hospital. Otherwise, call your doctor. · Afterward, the reaction should be reported to the Vaccine Adverse Event Reporting System (VAERS). Your doctor might file this report, or you can do it yourself through the VAERS web site at www.vaers. hhs.gov, or by calling 6-607.811.4461. VAERS does not give medical advice.   The [employer] Vaccine Injury Compensation Program  The National Vaccine Injury Compensation Program (VICP) is a federal program that was created to compensate people who may have been injured by certain vaccines. Persons who believe they may have been injured by a vaccine can learn about the program and about filing a claim by calling 0-394.898.7547 or visiting the [employer] website at www.[facility].gov/vaccinecompensation. There is a time limit to file a claim for compensation. How can I learn more? · Ask your doctor. He or she can give you the vaccine package insert or suggest other sources of information. · Call your local or state health department. · Contact the Centers for Disease Control and Prevention (CDC):  ? Call 0-317.338.6559 (1-800-CDC-INFO) or  ? Visit CDC's website at www.cdc.gov/vaccines  Vaccine Information Statement (Interim)  Tdap Vaccine  (2/24/15)  [address] [facility] [id number]  Department of Health and Human Services  Centers for Disease Control and Prevention  Many Vaccine Information Statements are available in [place] and other languages. See www.immunize.org/vis. Muchas hojas de información sobre vacunas están disponibles en español y en otros idiomas. Visite www.immunize.org/vis. Care instructions adapted under license by [employer] (which disclaims liability or warranty for this information). If you have questions about a medical condition or this instruction, always ask your healthcare professional. [address] any warranty or liability for your use of this information.

## 2020-01-07 LAB
ALBUMIN SERPL-MCNC: NORMAL G/DL
ALP SERPL-CCNC: NORMAL U/L
ALT SERPL-CCNC: NORMAL U/L
AST SERPL-CCNC: NORMAL U/L
BASOPHILS # BLD AUTO: NORMAL 10*3/UL
BILIRUB SERPL-MCNC: NORMAL MG/DL
BUN SERPL-MCNC: NORMAL MG/DL
CALCIUM SERPL-MCNC: NORMAL MG/DL
CHLORIDE SERPL-SCNC: NORMAL MMOL/L
CHOLEST SERPL-MCNC: NORMAL MG/DL
CO2 SERPL-SCNC: NORMAL MMOL/L
CREAT SERPL-MCNC: NORMAL MG/DL
EOSINOPHIL # BLD AUTO: NORMAL 10*3/UL
EOSINOPHIL NFR BLD AUTO: NORMAL %
GLUCOSE SERPL-MCNC: NORMAL MG/DL
HCT VFR BLD AUTO: NORMAL %
HDLC SERPL-MCNC: NORMAL MG/DL
HGB BLD-MCNC: NORMAL G/DL
LYMPHOCYTES # BLD AUTO: NORMAL 10*3/UL
LYMPHOCYTES NFR BLD AUTO: NORMAL %
MONOCYTES NFR BLD AUTO: NORMAL %
NEUTROPHILS NFR BLD AUTO: NORMAL %
PLATELET # BLD AUTO: NORMAL 10*3/UL
POTASSIUM SERPL-SCNC: NORMAL MMOL/L
PROT SERPL-MCNC: NORMAL G/DL
RBC # BLD AUTO: NORMAL 10*6/UL
SODIUM SERPL-SCNC: NORMAL MMOL/L
TRIGL SERPL-MCNC: NORMAL MG/DL (ref ?–150)
WBC # BLD AUTO: NORMAL X10E3/UL

## 2020-01-07 NOTE — PATIENT INSTRUCTIONS
Stopping Smoking: Care Instructions  Your Care Instructions  Cigarette smokers crave the nicotine in cigarettes. Giving it up is much harder than simply changing a habit. Your body has to stop craving the nicotine. It is hard to quit, but you can do it. There are many tools that people use to quit smoking. You may find that combining tools works best for you. There are several steps to quitting. First you get ready to quit. Then you get support to help you. After that, you learn new skills and behaviors to become a nonsmoker. For many people, a necessary step is getting and using medicine. Your doctor will help you set up the plan that best meets your needs. You may want to attend a smoking cessation program to help you quit smoking. When you choose a program, look for one that has proven success. Ask your doctor for ideas. You will greatly increase your chances of success if you take medicine as well as get counseling or join a cessation program.  Some of the changes you feel when you first quit tobacco are uncomfortable. Your body will miss the nicotine at first, and you may feel short-tempered and grumpy. You may have trouble sleeping or concentrating. Medicine can help you deal with these symptoms. You may struggle with changing your smoking habits and rituals. The last step is the tricky one: Be prepared for the smoking urge to continue for a time. This is a lot to deal with, but keep at it. You will feel better. Follow-up care is a key part of your treatment and safety. Be sure to make and go to all appointments, and call your doctor if you are having problems. It's also a good idea to know your test results and keep a list of the medicines you take. How can you care for yourself at home? · Ask your family, friends, and coworkers for support. You have a better chance of quitting if you have help and support.   · Join a support group, such as Nicotine Anonymous, for people who are trying to quit smoking. · Consider signing up for a smoking cessation program, such as the American Lung Association's Freedom from Smoking program.  · Get text messaging support. Go to the website at www.smokefree. gov to sign up for the [facility] program.  · Set a quit date. Pick your date carefully so that it is not right in the middle of a big deadline or stressful time. Once you quit, do not even take a puff. Get rid of all ashtrays and lighters after your last cigarette. Clean your house and your clothes so that they do not smell of smoke. · Learn how to be a nonsmoker. Think about ways you can avoid those things that make you reach for a cigarette. ? Avoid situations that put you at greatest risk for smoking. For some people, it is hard to have a drink with friends without smoking. For others, they might skip a coffee break with coworkers who smoke. ? Change your daily routine. Take a different route to work or eat a meal in a different place. · Cut down on stress. Calm yourself or release tension by doing an activity you enjoy, such as reading a book, taking a hot bath, or gardening. · Talk to your doctor or pharmacist about nicotine replacement therapy, which replaces the nicotine in your body. You still get nicotine but you do not use tobacco. Nicotine replacement products help you slowly reduce the amount of nicotine you need. These products come in several forms, many of them available over-the-counter:  ? Nicotine patches  ? Nicotine gum and lozenges  ? Nicotine inhaler  · Ask your doctor about bupropion (Wellbutrin) or varenicline (Chantix), which are prescription medicines. They do not contain nicotine. They help you by reducing withdrawal symptoms, such as stress and anxiety. · Some people find hypnosis, acupuncture, and massage helpful for ending the smoking habit. · Eat a healthy diet and get regular exercise. Having healthy habits will help your body move past its craving for nicotine.   · Be prepared to keep trying. Most people are not successful the first few times they try to quit. Do not get mad at yourself if you smoke again. Make a list of things you learned and think about when you want to try again, such as next week, next month, or next year. Where can you learn more? Go to http://gi-emeterio.info/. Enter E185 in the search box to learn more about \"Stopping Smoking: Care Instructions. \"  Current as of: September 26, 2018  Content Version: 12.2  © 5642-7786 [employer]. Care instructions adapted under license by [employer] (which disclaims liability or warranty for this information). If you have questions about a medical condition or this instruction, always ask your healthcare professional. [address] any warranty or liability for your use of this information.

## 2020-01-08 ENCOUNTER — LAB ONLY (OUTPATIENT)
Dept: INTERNAL MEDICINE CLINIC | Facility: CLINIC | Age: 55
End: 2020-01-08

## 2020-01-08 DIAGNOSIS — Z13.220 SCREENING FOR LIPID DISORDERS: Primary | ICD-10-CM

## 2020-01-08 DIAGNOSIS — D64.9 ANEMIA, UNSPECIFIED TYPE: ICD-10-CM

## 2020-01-09 LAB
ALBUMIN SERPL-MCNC: 4.3 G/DL (ref 3.5–5.5)
ALBUMIN/GLOB SERPL: 1.3 {RATIO} (ref 1.2–2.2)
ALP SERPL-CCNC: 84 IU/L (ref 39–117)
ALT SERPL-CCNC: 20 IU/L (ref 0–32)
AST SERPL-CCNC: 18 IU/L (ref 0–40)
BASOPHILS # BLD AUTO: 0 X10E3/UL (ref 0–0.2)
BASOPHILS NFR BLD AUTO: 0 %
BILIRUB SERPL-MCNC: 0.4 MG/DL (ref 0–1.2)
BUN SERPL-MCNC: 7 MG/DL (ref 6–24)
BUN/CREAT SERPL: 10 (ref 9–23)
CALCIUM SERPL-MCNC: 9.3 MG/DL (ref 8.7–10.2)
CHLORIDE SERPL-SCNC: 103 MMOL/L (ref 96–106)
CHOLEST SERPL-MCNC: 212 MG/DL (ref 100–199)
CO2 SERPL-SCNC: 25 MMOL/L (ref 20–29)
CREAT SERPL-MCNC: 0.69 MG/DL (ref 0.57–1)
EOSINOPHIL # BLD AUTO: 0.1 X10E3/UL (ref 0–0.4)
EOSINOPHIL NFR BLD AUTO: 3 %
ERYTHROCYTE [DISTWIDTH] IN BLOOD BY AUTOMATED COUNT: 12.6 % (ref 11.7–15.4)
GLOBULIN SER CALC-MCNC: 3.3 G/DL (ref 1.5–4.5)
GLUCOSE SERPL-MCNC: 83 MG/DL (ref 65–99)
HCT VFR BLD AUTO: 33.1 % (ref 34–46.6)
HDLC SERPL-MCNC: 86 MG/DL
HGB BLD-MCNC: 10.9 G/DL (ref 11.1–15.9)
IMM GRANULOCYTES # BLD AUTO: 0 X10E3/UL (ref 0–0.1)
IMM GRANULOCYTES NFR BLD AUTO: 0 %
LDLC SERPL CALC-MCNC: 111 MG/DL (ref 0–99)
LYMPHOCYTES # BLD AUTO: 1.9 X10E3/UL (ref 0.7–3.1)
LYMPHOCYTES NFR BLD AUTO: 36 %
MCH RBC QN AUTO: 27.4 PG (ref 26.6–33)
MCHC RBC AUTO-ENTMCNC: 32.9 G/DL (ref 31.5–35.7)
MCV RBC AUTO: 83 FL (ref 79–97)
MONOCYTES # BLD AUTO: 0.3 X10E3/UL (ref 0.1–0.9)
MONOCYTES NFR BLD AUTO: 6 %
NEUTROPHILS # BLD AUTO: 2.9 X10E3/UL (ref 1.4–7)
NEUTROPHILS NFR BLD AUTO: 55 %
PLATELET # BLD AUTO: 227 X10E3/UL (ref 150–450)
POTASSIUM SERPL-SCNC: 3.8 MMOL/L (ref 3.5–5.2)
PROT SERPL-MCNC: 7.6 G/DL (ref 6–8.5)
RBC # BLD AUTO: 3.98 X10E6/UL (ref 3.77–5.28)
SODIUM SERPL-SCNC: 142 MMOL/L (ref 134–144)
TRIGL SERPL-MCNC: 75 MG/DL (ref 0–149)
VLDLC SERPL CALC-MCNC: 15 MG/DL (ref 5–40)
WBC # BLD AUTO: 5.3 X10E3/UL (ref 3.4–10.8)

## 2020-01-09 NOTE — PROGRESS NOTES
Blood sugar, kidney and liver function, and electrolytes are normal.   Anemia is stable. Cholesterol is essentially same as 1 year ago- slightly high LDL, goal is less than 100. Work on reducing saturated fats found commonly in fast food, fried food and processed foods, and eat plenty of vegetables and fruits.

## 2020-01-10 NOTE — PROGRESS NOTES
I called and verified the patient by name and date of birth, then proceeded with the message from the provider. The patient understood and had no questions at this time.

## 2020-02-29 ENCOUNTER — OFFICE VISIT (OUTPATIENT)
Dept: URGENT CARE | Age: 55
End: 2020-02-29

## 2020-02-29 VITALS
HEART RATE: 85 BPM | OXYGEN SATURATION: 98 % | RESPIRATION RATE: 18 BRPM | HEIGHT: 68 IN | DIASTOLIC BLOOD PRESSURE: 84 MMHG | TEMPERATURE: 99.2 F | SYSTOLIC BLOOD PRESSURE: 125 MMHG | WEIGHT: 137 LBS | BODY MASS INDEX: 20.76 KG/M2

## 2020-02-29 DIAGNOSIS — R50.9 FEVER, UNSPECIFIED FEVER CAUSE: ICD-10-CM

## 2020-02-29 DIAGNOSIS — J11.1 INFLUENZA: Primary | ICD-10-CM

## 2020-02-29 LAB
FLUAV+FLUBV AG NOSE QL IA.RAPID: NEGATIVE POS/NEG
FLUAV+FLUBV AG NOSE QL IA.RAPID: POSITIVE POS/NEG
VALID INTERNAL CONTROL?: YES

## 2020-02-29 RX ORDER — OSELTAMIVIR PHOSPHATE 75 MG/1
75 CAPSULE ORAL 2 TIMES DAILY
Qty: 10 CAP | Refills: 0 | Status: SHIPPED | OUTPATIENT
Start: 2020-02-29 | End: 2020-03-05

## 2020-02-29 NOTE — LETTER
[address] 
[facility] [place] [address] 
[phone] Work/School Note Date: 2/29/2020 To Whom It May concern: 
 
Cristin Scherer was seen and treated today in the urgent care center by the following provider(s): 
No providers found. Cristin Scherer may return to work on 3/4/20. Sincerely, Sugar Adan NP

## 2020-02-29 NOTE — PATIENT INSTRUCTIONS
Influenza (Flu): Care Instructions  Your Care Instructions    Influenza (flu) is an infection in the lungs and breathing passages. It is caused by the influenza virus. There are different strains, or types, of the flu virus from year to year. Unlike the common cold, the flu comes on suddenly and the symptoms, such as a cough, congestion, fever, chills, fatigue, aches, and pains, are more severe. These symptoms may last up to 10 days. Although the flu can make you feel very sick, it usually doesn't cause serious health problems. Home treatment is usually all you need for flu symptoms. But your doctor may prescribe antiviral medicine to prevent other health problems, such as pneumonia, from developing. Older people and those who have a long-term health condition, such as lung disease, are most at risk for having pneumonia or other health problems. Follow-up care is a key part of your treatment and safety. Be sure to make and go to all appointments, and call your doctor if you are having problems. It's also a good idea to know your test results and keep a list of the medicines you take. How can you care for yourself at home? · Get plenty of rest.  · Drink plenty of fluids, enough so that your urine is light yellow or clear like water. If you have kidney, heart, or liver disease and have to limit fluids, talk with your doctor before you increase the amount of fluids you drink. · Take an over-the-counter pain medicine if needed, such as acetaminophen (Tylenol), ibuprofen (Advil, Motrin), or naproxen (Aleve), to relieve fever, headache, and muscle aches. Read and follow all instructions on the label. No one younger than 20 should take aspirin. It has been linked to Reye syndrome, a serious illness. · Do not smoke. Smoking can make the flu worse. If you need help quitting, talk to your doctor about stop-smoking programs and medicines. These can increase your chances of quitting for good.   · Breathe moist air from a hot shower or from a sink filled with hot water to help clear a stuffy nose. · Before you use cough and cold medicines, check the label. These medicines may not be safe for young children or for people with certain health problems. · If the skin around your nose and lips becomes sore, put some petroleum jelly on the area. · To ease coughing:  ? Drink fluids to soothe a scratchy throat. ? Suck on cough drops or plain hard candy. ? Take an over-the-counter cough medicine that contains dextromethorphan to help you get some sleep. Read and follow all instructions on the label. ? Raise your head at night with an extra pillow. This may help you rest if coughing keeps you awake. · Take any prescribed medicine exactly as directed. Call your doctor if you think you are having a problem with your medicine. To avoid spreading the flu  · Wash your hands regularly, and keep your hands away from your face. · Stay home from school, work, and other public places until you are feeling better and your fever has been gone for at least 24 hours. The fever needs to have gone away on its own without the help of medicine. · Ask people living with you to talk to their doctors about preventing the flu. They may get antiviral medicine to keep from getting the flu from you. · To prevent the flu in the future, get a flu vaccine every fall. Encourage people living with you to get the vaccine. · Cover your mouth when you cough or sneeze. When should you call for help? Call 911 anytime you think you may need emergency care.  For example, call if:    · You have severe trouble breathing.    Call your doctor now or seek immediate medical care if:    · You have new or worse trouble breathing.     · You seem to be getting much sicker.     · You feel very sleepy or confused.     · You have a new or higher fever.     · You get a new rash.    Watch closely for changes in your health, and be sure to contact your doctor if:    · You begin to get better and then get worse.     · You are not getting better after 1 week. Where can you learn more? Go to http://gi-emeterio.info/. Enter K094 in the search box to learn more about \"Influenza (Flu): Care Instructions. \"  Current as of: June 9, 2019  Content Version: 12.2  © 7085-5397 [employer]. Care instructions adapted under license by [employer] (which disclaims liability or warranty for this information). If you have questions about a medical condition or this instruction, always ask your healthcare professional. [place] [address] any warranty or liability for your use of this information.

## 2020-02-29 NOTE — PROGRESS NOTES
The patient presents with flu symptoms. These started 2-3 days ago and have been severe. The history is provided by the patient. Cold Symptoms   The history is provided by the patient. This is a new problem. The current episode started more than 2 days ago. The problem occurs constantly. The problem has been gradually worsening. The cough is non-productive. There has been a fever of 101 - 101.9 F. Associated symptoms include chills, rhinorrhea and myalgias. History reviewed. No pertinent past medical history.      Past Surgical History:   Procedure Laterality Date    HX HYSTERECTOMY  2009    MYOMECTOMY 1-4,W/TOT 250GMS/<,ABD APPRCH           Family History   Problem Relation Age of Onset    Hypertension Mother         Social History     Socioeconomic History    Marital status:      Spouse name: Not on file    Number of children: Not on file    Years of education: Not on file    Highest education level: Not on file   Occupational History    Not on file   Social Needs    Financial resource strain: Not on file    Food insecurity:     Worry: Not on file     Inability: Not on file    Transportation needs:     Medical: Not on file     Non-medical: Not on file   Tobacco Use    Smoking status: Never Smoker    Smokeless tobacco: Never Used   Substance and Sexual Activity    Alcohol use: Yes     Comment: rare     Drug use: No    Sexual activity: Not on file   Lifestyle    Physical activity:     Days per week: Not on file     Minutes per session: Not on file    Stress: Not on file   Relationships    Social connections:     Talks on phone: Not on file     Gets together: Not on file     Attends Alevism service: Not on file     Active member of club or organization: Not on file     Attends meetings of clubs or organizations: Not on file     Relationship status: Not on file    Intimate partner violence:     Fear of current or ex partner: Not on file     Emotionally abused: Not on file Physically abused: Not on file     Forced sexual activity: Not on file   Other Topics Concern    Not on file   Social History Narrative    Not on file                ALLERGIES: Naproxen sodium and Other medication    Review of Systems   Constitutional: Positive for chills, fatigue and fever. Negative for activity change and appetite change. HENT: Positive for congestion, postnasal drip and rhinorrhea. Negative for sinus pressure and sinus pain. Respiratory: Positive for cough. Musculoskeletal: Positive for myalgias. Vitals:    02/29/20 1549 02/29/20 1551   BP:  125/84   Pulse:  85   Resp:  18   Temp:  99.2 °F (37.3 °C)   SpO2:  98%   Weight: 137 lb (62.1 kg)    Height: 5' 7.5\" (1.715 m)        Physical Exam  Constitutional:       General: She is not in acute distress. Appearance: She is well-developed. She is ill-appearing. HENT:      Head: Normocephalic. Right Ear: Tympanic membrane and ear canal normal. No drainage. Tympanic membrane is not erythematous or bulging. Left Ear: Tympanic membrane and ear canal normal. No drainage. Tympanic membrane is not erythematous or bulging. Nose: Nose normal. No rhinorrhea. Mouth/Throat:      Pharynx: No oropharyngeal exudate or posterior oropharyngeal erythema. Cardiovascular:      Rate and Rhythm: Normal rate and regular rhythm. Heart sounds: Normal heart sounds. Pulmonary:      Effort: Pulmonary effort is normal. No respiratory distress. Breath sounds: Normal breath sounds. No decreased breath sounds or rhonchi. Lymphadenopathy:      Cervical: No cervical adenopathy. MDM    ICD-10-CM ICD-9-CM    1. Influenza J11.1 487.1 oseltamivir (TAMIFLU) 75 mg capsule   2. Fever, unspecified fever cause R50.9 780.60 AMB POC HARINI INFLUENZA A/B TEST     Medications Ordered Today   Medications    oseltamivir (TAMIFLU) 75 mg capsule     Sig: Take 1 Cap by mouth two (2) times a day for 5 days.      Dispense:  10 Cap     Refill: 0     Results for orders placed or performed in visit on 02/29/20   AMB POC HARINI INFLUENZA A/B TEST   Result Value Ref Range    VALID INTERNAL CONTROL POC Yes     Influenza A Ag POC Positive Negative Pos/Neg    Influenza B Ag POC Negative Negative Pos/Neg     The patients condition was discussed with the patient and they understand. The patient is to follow up with primary care doctor. If signs and symptoms become worse the pt is to go to the ER. The patient is to take medications as prescribed.      Procedures

## 2021-05-03 ENCOUNTER — OFFICE VISIT (OUTPATIENT)
Dept: INTERNAL MEDICINE CLINIC | Age: 56
End: 2021-05-03
Payer: COMMERCIAL

## 2021-05-03 VITALS
OXYGEN SATURATION: 95 % | HEART RATE: 69 BPM | BODY MASS INDEX: 20.7 KG/M2 | WEIGHT: 136.6 LBS | TEMPERATURE: 98 F | HEIGHT: 68 IN | RESPIRATION RATE: 16 BRPM | DIASTOLIC BLOOD PRESSURE: 73 MMHG | SYSTOLIC BLOOD PRESSURE: 105 MMHG

## 2021-05-03 DIAGNOSIS — Z13.5 SCREENING FOR GLAUCOMA: ICD-10-CM

## 2021-05-03 DIAGNOSIS — Z00.00 WELL ADULT EXAM: Primary | ICD-10-CM

## 2021-05-03 PROCEDURE — 99396 PREV VISIT EST AGE 40-64: CPT | Performed by: INTERNAL MEDICINE

## 2021-05-03 NOTE — PROGRESS NOTES
HPI  Ms. Robyn Plummer is a 60y.o. year old female, she is seen today for well exam. Has been well. No chest pain, sob, dizziness, weakness, lightheadedness. No n/va/bd pain, melena or brbpr. Weight is stable. Goes to [facility] [place] [facility] [address] [facility], NP - up to date with gyn and mammo. Last seen in February. Got moderna vaccine 2nd dose April 24. Chief Complaint   Patient presents with    Physical        Prior to Admission medications    Medication Sig Start Date End Date Taking? Authorizing Provider   ergocalciferol, vitamin D2, (VITAMIN D2 PO) Take  by mouth. Yes Provider, Historical   Cetirizine (ZYRTEC) 10 mg cap Take  by mouth. Yes Provider, Historical         Allergies   Allergen Reactions    Naproxen Sodium Nausea and Vomiting    Other Medication Other (comments)     ? antibiotic         REVIEW OF SYSTEMS:  Per HPI    PHYSICAL EXAM:  Visit Vitals  /73 (BP 1 Location: Left upper arm, BP Patient Position: Sitting, BP Cuff Size: Adult)   Pulse 69   Temp 98 °F (36.7 °C) (Oral)   Resp 16   Ht 5' 7.5\" (1.715 m)   Wt 136 lb 9.6 oz (62 kg)   SpO2 95%   BMI 21.08 kg/m²     Constitutional: Appears well-developed and well-nourished. No distress. HENT:   Head: Normocephalic and atraumatic. Eyes: No scleral icterus. Mouth: OP clear without lesions, no pharyngeal exudate  Neck: no lad, no tm, supple   Cardiovascular: Normal S1/S2, regular rhythm. No murmurs, rubs, or gallops. Pulmonary/Chest: Effort normal and breath sounds normal. No respiratory distress. No wheezes, rhonchi, or rales. Abdomen: Soft, NT/ND, +BS, no rebound or guarding, no masses, no HSM appreciated. Ext: No edema. Neurological: Alert. Psychiatric: Normal mood and affect.  Behavior is normal.     Lab Results   Component Value Date/Time    Sodium 142 01/08/2020 09:30 AM    Potassium 3.8 01/08/2020 09:30 AM    Chloride 103 01/08/2020 09:30 AM    CO2 25 01/08/2020 09:30 AM    Anion gap 5 10/27/2017 08:34 PM    Glucose 83 01/08/2020 09:30 AM    BUN 7 01/08/2020 09:30 AM    Creatinine 0.69 01/08/2020 09:30 AM    BUN/Creatinine ratio 10 01/08/2020 09:30 AM    GFR est  01/08/2020 09:30 AM    GFR est non-AA 98 01/08/2020 09:30 AM    Calcium 9.3 01/08/2020 09:30 AM    Bilirubin, total 0.4 01/08/2020 09:30 AM    Alk. phosphatase 84 01/08/2020 09:30 AM    Protein, total 7.6 01/08/2020 09:30 AM    Albumin 4.3 01/08/2020 09:30 AM    Globulin 4.9 (H) 10/27/2017 08:34 PM    A-G Ratio 1.3 01/08/2020 09:30 AM    ALT (SGPT) 20 01/08/2020 09:30 AM     No results found for: HBA1C, HGBE8, OQF9UVUK, [id number]   Lab Results   Component Value Date/Time    Cholesterol, total 212 (H) 01/08/2020 09:30 AM    HDL Cholesterol 86 01/08/2020 09:30 AM    LDL, calculated 111 (H) 01/08/2020 09:30 AM    VLDL, calculated 15 01/08/2020 09:30 AM    Triglyceride 75 01/08/2020 09:30 AM          ASSESSMENT/PLAN  Diagnoses and all orders for this visit:    1. Well adult exam  -     METABOLIC PANEL, COMPREHENSIVE; Future  -     LIPID PANEL; Future  -     CBC WITH AUTOMATED DIFF; Future    2. Screening for glaucoma  -     REFERRAL TO OPTOMETRY      Will get mammo, [facility] - wait on tdap as just got 2nd moderna vaccine    Health Maintenance Due   Topic Date Due    COVID-19 Vaccine (1) Never done    Shingrix Vaccine Age 50> (1 of 2) Never done    PAP AKA CERVICAL CYTOLOGY  02/01/2016    DTaP/Tdap/Td series (2 - Td) 01/08/2018    Breast Cancer Screen Mammogram  08/09/2020        Follow-up and Dispositions    · Return in about 1 year (around 5/3/2022) for well exam.            Reviewed plan of care. Patient has provided input and agrees with goals. The nurse provided the patient and/or family with advanced directive information if needed and encouraged the patient to provide a copy to the office when available.

## 2021-05-03 NOTE — PROGRESS NOTES
Citlalli Benavidez  Identified pt with two pt identifiers(name and ). Chief Complaint   Patient presents with    Physical       Reviewed record In preparation for visit and have obtained necessary documentation. 1. Have you been to the ER, urgent care clinic or hospitalized since your last visit? No     2. Have you seen or consulted any other health care providers outside of the [address] [place] [address] since your last visit? Include any pap smears or colon screening. No    Patient does not have an advance directive. Vitals reviewed with provider.     Health Maintenance reviewed:     Health Maintenance Due   Topic    COVID-19 Vaccine (1)    Shingrix Vaccine Age 50> (1 of 2)    PAP AKA CERVICAL CYTOLOGY     DTaP/Tdap/Td series (2 - Td)    Breast Cancer Screen Mammogram         Wt Readings from Last 3 Encounters:   21 136 lb 9.6 oz (62 kg)   20 137 lb (62.1 kg)   20 137 lb 9.6 oz (62.4 kg)      Temp Readings from Last 3 Encounters:   21 98 °F (36.7 °C) (Oral)   20 99.2 °F (37.3 °C)   20 97.9 °F (36.6 °C) (Oral)      BP Readings from Last 3 Encounters:   21 105/73   20 125/84   20 118/78      Pulse Readings from Last 3 Encounters:   21 69   20 85   20 (!) 59      Vitals:    21 1429   BP: 105/73   Pulse: 69   Resp: 16   Temp: 98 °F (36.7 °C)   TempSrc: Oral   SpO2: 95%   Weight: 136 lb 9.6 oz (62 kg)   Height: 5' 7.5\" (1.715 m)   PainSc:   0 - No pain          Learning Assessment:   :     Learning Assessment 2015   PRIMARY LEARNER Patient   HIGHEST LEVEL OF EDUCATION - PRIMARY LEARNER  [employer]   BARRIERS PRIMARY LEARNER NONE   CO-LEARNER CAREGIVER No   PRIMARY LANGUAGE ENGLISH   LEARNER PREFERENCE PRIMARY DEMONSTRATION   ANSWERED BY patient   RELATIONSHIP SELF        Depression Screening:   :     3 most recent PHQ Screens 5/3/2021   Little interest or pleasure in doing things Not at all   Feeling down, depressed, irritable, or hopeless Not at all   Total Score PHQ 2 0        Fall Risk Assessment:   :     Fall Risk Assessment, last 12 mths 12/12/2018   Able to walk? Yes   Fall in past 12 months? No        Abuse Screening:   :     Abuse Screening Questionnaire 12/12/2018   Do you ever feel afraid of your partner? N   Are you in a relationship with someone who physically or mentally threatens you? N   Is it safe for you to go home?  Y        ADL Screening:   :     ADL Assessment 1/3/2020   Feeding yourself No Help Needed   Getting from bed to chair No Help Needed   Getting dressed No Help Needed   Bathing or showering No Help Needed   Walk across the room (includes cane/walker) No Help Needed   Using the telphone No Help Needed   Taking your medications No Help Needed   Preparing meals No Help Needed   Managing money (expenses/bills) No Help Needed   Moderately strenuous housework (laundry) No Help Needed   Shopping for personal items (toiletries/medicines) No Help Needed   Shopping for groceries No Help Needed   Driving No Help Needed   Climbing a flight of stairs No Help Needed   Getting to places beyond walking distances No Help Needed

## 2021-06-24 NOTE — PROGRESS NOTES
HPI  Shaikra Soliman is a 52y.o. year old female patient of Estephania Dixon MD who presents with c/o CPE for insurance. Pt has history of has Anemia on their problem list..    Lifestyle  Diet: tries to eat healthy, sometimes eats fried foods  Exercise: no routine exercise, on her feet all day at work- works as a   ETOH: rare, holidays  Nicotine: denies    Denies chest pain, chest pressure, or palpitations. Denies SOB, orthopnea, or PND. Denies lower extremity swelling. Denies HA, dizziness, or blurred vision. Denies N/V/D, heartburn, constipation, or abd pain. Denies BRBPR, melena, or blood in urine. Denies feeling down or depressed or anxious. Denies trouble sleeping, sometimes has hot flashes at night. Patient Active Problem List   Diagnosis Code    Anemia D64.9     No past medical history on file. Past Surgical History:   Procedure Laterality Date    HX HYSTERECTOMY  2009    MYOMECTOMY 1-4,W/TOT 250GMS/<,ABD APPRCH       Social History     Socioeconomic History    Marital status:      Spouse name: Not on file    Number of children: Not on file    Years of education: Not on file    Highest education level: Not on file   Tobacco Use    Smoking status: Never Smoker    Smokeless tobacco: Never Used   Substance and Sexual Activity    Alcohol use: Yes     Comment: rare     Drug use: No     Family History   Problem Relation Age of Onset    Hypertension Mother      Allergies   Allergen Reactions    Naproxen Sodium Nausea and Vomiting    Other Medication Other (comments)     ? antibiotic       MEDICATIONS  Current Outpatient Medications   Medication Sig    Cetirizine (ZYRTEC) 10 mg cap Take  by mouth. No current facility-administered medications for this visit.         REVIEW OF SYSTEMS  Per HPI        Visit Vitals  /78 (BP 1 Location: Left arm, BP Patient Position: Sitting)   Pulse 67   Temp 98.3 °F (36.8 °C) (Oral)   Resp 16   Ht 5' 7\" (1.702 m)   Wt 135 lb 6.4 oz (61.4 kg)   SpO2 97%   BMI 21.21 kg/m²         General: Well-developed, well-nourished. In no distress. A&O x 3. Head: Normocephalic, atraumatic. Eyes: Conjunctiva clear. Pupils equal, round, reactive to light. Extraocular movements intact. Ears/Nose: TM's and ear canals normal bilaterally. Nares normal. Septum midline. Normal nasal mucosa. No drainage or sinus tenderness. Mouth/Throat: Lips, mucosa, and tongue normal. Oropharynx benign. Neck: Supple, symmetrical, trachea midline, no lymphadenopathy, no carotid bruits, no JVD, thyroid: not enlarged, symmetric, no tenderness/mass/nodules. Lungs: Clear to auscultation bilaterally. No crackles or wheezes. No use of accessory muscles. Speaks in full sentences without SOB. Chest Wall: No tenderness or deformity. Heart: RRR, normal S1 and S2, no murmur, click, rub, or gallop. Back: Symmetric. ROM intact. No CVA tenderness. Abdomen: Soft, non-distended, bowel sounds normal. No tenderness. No masses. No hepatosplenomegaly. Skin: No rashes or lesions. Neurovasc: No edema appreciated. Dorsalis pedis pulses are 2+ on the right side, and 2+ on the left side. Posterior tibial pulses are 2+ on the right side, and 2+ on the left side. Musculoskeletal: Gait normal.   Psychiatric: Normal mood and affect. Behavior is normal.         ASSESSMENT and PLAN  Diagnoses and all orders for this visit:    1. Annual physical exam  -     METABOLIC PANEL, COMPREHENSIVE  -     LIPID PANEL  -     CBC WITH AUTOMATED DIFF  -discussed importance of healthy diet and exercise     2. Anemia, unspecified type  -     CBC WITH AUTOMATED DIFF            Patient Instructions      Well Visit, Ages 25 to 48: Care Instructions  Your Care Instructions    Physical exams can help you stay healthy. Your doctor has checked your overall health and may have suggested ways to take good care of yourself. He or she also may have recommended tests.  At home, you can help prevent illness with healthy eating, regular exercise, and other steps. Follow-up care is a key part of your treatment and safety. Be sure to make and go to all appointments, and call your doctor if you are having problems. It's also a good idea to know your test results and keep a list of the medicines you take. How can you care for yourself at home? · Reach and stay at a healthy weight. This will lower your risk for many problems, such as obesity, diabetes, heart disease, and high blood pressure. · Get at least 30 minutes of physical activity on most days of the week. Walking is a good choice. You also may want to do other activities, such as running, swimming, cycling, or playing tennis or team sports. Discuss any changes in your exercise program with your doctor. · Do not smoke or allow others to smoke around you. If you need help quitting, talk to your doctor about stop-smoking programs and medicines. These can increase your chances of quitting for good. · Talk to your doctor about whether you have any risk factors for sexually transmitted infections (STIs). Having one sex partner (who does not have STIs and does not have sex with anyone else) is a good way to avoid these infections. · Use birth control if you do not want to have children at this time. Talk with your doctor about the choices available and what might be best for you. · Protect your skin from too much sun. When you're outdoors from 10 a.m. to 4 p.m., stay in the shade or cover up with clothing and a hat with a wide brim. Wear sunglasses that block UV rays. Even when it's cloudy, put broad-spectrum sunscreen (SPF 30 or higher) on any exposed skin. · See a dentist one or two times a year for checkups and to have your teeth cleaned. · Wear a seat belt in the car. · Drink alcohol in moderation, if at all. That means no more than 2 drinks a day for men and 1 drink a day for women.   Follow your doctor's advice about when to have certain tests. These tests can spot problems early. For everyone  · Cholesterol. Have the fat (cholesterol) in your blood tested after age 21. Your doctor will tell you how often to have this done based on your age, family history, or other things that can increase your risk for heart disease. · Blood pressure. Have your blood pressure checked during a routine doctor visit. Your doctor will tell you how often to check your blood pressure based on your age, your blood pressure results, and other factors. · Vision. Talk with your doctor about how often to have a glaucoma test.  · Diabetes. Ask your doctor whether you should have tests for diabetes. · Colon cancer. Have a test for colon cancer at age 48. You may have one of several tests. If you are younger than 48, you may need a test earlier if you have any risk factors. Risk factors include whether you already had a precancerous polyp removed from your colon or whether your parent, brother, sister, or child has had colon cancer. For women  · Breast exam and mammogram. Talk to your doctor about when you should have a clinical breast exam and a mammogram. Medical experts differ on whether and how often women under 50 should have these tests. Your doctor can help you decide what is right for you. · Pap test and pelvic exam. Begin Pap tests at age 24. A Pap test is the best way to find cervical cancer. The test often is part of a pelvic exam. Ask how often to have this test.  · Tests for sexually transmitted infections (STIs). Ask whether you should have tests for STIs. You may be at risk if you have sex with more than one person, especially if your partners do not wear condoms. For men  · Tests for sexually transmitted infections (STIs). Ask whether you should have tests for STIs. You may be at risk if you have sex with more than one person, especially if you do not wear a condom.   · Testicular cancer exam. Ask your doctor whether you should check your testicles regularly. · Prostate exam. Talk to your doctor about whether you should have a blood test (called a PSA test) for prostate cancer. Experts differ on whether and when men should have this test. Some experts suggest it if you are older than 39 and are -American or have a father or brother who got prostate cancer when he was younger than 72. When should you call for help? Watch closely for changes in your health, and be sure to contact your doctor if you have any problems or symptoms that concern you. Where can you learn more? Go to http://gi-emeterio.info/. Enter P072 in the search box to learn more about \"Well Visit, Ages 25 to 48: Care Instructions. \"  Current as of: March 28, 2018  Content Version: 11.9  © 2748-5553 [employer], [employer]. Care instructions adapted under license by "[employer] (which disclaims liability or warranty for this information). If you have questions about a medical condition or this instruction, always ask your healthcare professional. [place] [address] any warranty or liability for your use of this information. Please keep your follow-up appointment with Nithin Azevedo MD.     Follow-up Disposition:  Return in about 1 year (around 2/8/2020), or if symptoms worsen or fail to improve, for Annual physical .    Health Maintenance Due   Topic Date Due    BREAST CANCER SCRN MAMMOGRAM  02/01/2015    PAP AKA CERVICAL CYTOLOGY  02/01/2016    DTaP/Tdap/Td series (2 - Td) 01/08/2018   [facility] mammogram and PAP in 2018 told both normal, will request.       I have discussed the diagnosis with the patient and the intended plan as seen in the above orders. Patient is in agreement. The patient has received an after-visit summary and questions were answered concerning future plans. I have discussed medication side effects and warnings with the patient as well.  Warning signs for the above conditions were discussed No including when to call our office or go to the emergency room. The nurse provided the patient and/or family with advanced directive information if needed and encouraged the patient to provide a copy to the office when available.

## 2021-10-04 ENCOUNTER — TRANSCRIBE ORDER (OUTPATIENT)
Dept: SCHEDULING | Age: 56
End: 2021-10-04

## 2021-10-04 DIAGNOSIS — H40.013 OPEN ANGLE WITH BORDERLINE FINDINGS, LOW RISK, BILATERAL: Primary | ICD-10-CM

## 2021-11-22 ENCOUNTER — TRANSCRIBE ORDER (OUTPATIENT)
Dept: SCHEDULING | Age: 56
End: 2021-11-22

## 2021-11-22 DIAGNOSIS — H40.013 OPEN ANGLE WITH BORDERLINE FINDINGS, LOW RISK, BILATERAL: Primary | ICD-10-CM

## 2021-12-14 ENCOUNTER — TRANSCRIBE ORDER (OUTPATIENT)
Dept: SCHEDULING | Age: 56
End: 2021-12-14

## 2021-12-14 DIAGNOSIS — H40.013 OPEN ANGLE WITH BORDERLINE FINDINGS, LOW RISK, BILATERAL: Primary | ICD-10-CM

## 2021-12-24 ENCOUNTER — TRANSCRIBE ORDER (OUTPATIENT)
Dept: SCHEDULING | Age: 56
End: 2021-12-24

## 2021-12-26 ENCOUNTER — HOSPITAL ENCOUNTER (OUTPATIENT)
Dept: MRI IMAGING | Age: 56
Discharge: HOME | End: 2021-12-26
Attending: OPHTHALMOLOGY
Payer: COMMERCIAL

## 2021-12-26 DIAGNOSIS — H40.013 OPEN ANGLE WITH BORDERLINE FINDINGS, LOW RISK, BILATERAL: ICD-10-CM

## 2021-12-26 PROCEDURE — 70553 MRI BRAIN STEM W/O & W/DYE: CPT

## 2021-12-26 PROCEDURE — 74011250636 HC RX REV CODE- 250/636: Performed by: OPHTHALMOLOGY

## 2021-12-26 PROCEDURE — A9576 INJ PROHANCE MULTIPACK: HCPCS | Performed by: OPHTHALMOLOGY

## 2021-12-26 RX ADMIN — GADOTERIDOL 12 ML: 279.3 INJECTION, SOLUTION INTRAVENOUS at 09:36

## 2022-03-19 PROBLEM — D64.9 ANEMIA: Status: ACTIVE | Noted: 2017-02-27

## 2022-06-07 NOTE — PROGRESS NOTES
UMER Montalvo is a 59y.o. year old female patient of Shaunna Palmer MD who presents with c/o   Chief Complaint   Patient presents with    Physical       Pt has history of has Anemia on their problem list..    Pt here for physical.     Lifestyle  Diet: eats normal diet, cut out sodas, cut out coffee 3 weeks ago, drinks green tea, eats fruits/vegetables, some fried food at home  Exercise: gardening, no routine exercise   ETOH: denies  Nicotine: denies    Denies chest pain, chest pressure, or palpitations. Denies SOB, orthopnea, or PND. Denies lower extremity swelling. Denies HA, dizziness. Denies N/V/D or abd pain. Denies BRBPR, melena, or blood in urine. Had shingles vaccine at [facility] in [place]. Health Maintenance Overdue  Health Maintenance Due   Topic Date Due    COVID-19 Vaccine (1) Never done    Shingrix Vaccine Age 50> (1 of 2) Never done    DTaP/Tdap/Td series (2 - Td or Tdap) 01/08/2018    Breast Cancer Screen Mammogram  02/23/2022    Depression Screen  05/03/2022       Depression Screen  3 most recent PHQ Screens 6/9/2022   Little interest or pleasure in doing things Not at all   Feeling down, depressed, irritable, or hopeless Not at all   Total Score PHQ 2 0           Patient Active Problem List   Diagnosis Code    Anemia D64.9     No past medical history on file. Past Surgical History:   Procedure Laterality Date    HX HYSTERECTOMY  2009    CT MYOMECTOMY 1-4,W/TOT 250GMS/<,ABD APPRCH       Social History     Socioeconomic History    Marital status:    Tobacco Use    Smoking status: Never Smoker    Smokeless tobacco: Never Used   Substance and Sexual Activity    Alcohol use: Yes     Comment: rare     Drug use: No     Family History   Problem Relation Age of Onset    Hypertension Mother      Allergies   Allergen Reactions    Naproxen Sodium Nausea and Vomiting    Other Medication Other (comments)     ?  antibiotic       MEDICATIONS  Current Outpatient Medications   Medication Sig    ergocalciferol, vitamin D2, (VITAMIN D2 PO) Take  by mouth.  Cetirizine (ZYRTEC) 10 mg cap Take  by mouth. No current facility-administered medications for this visit. REVIEW OF SYSTEMS  Per HPI        Visit Vitals  /83 (BP 1 Location: Left upper arm, BP Patient Position: Sitting)   Pulse 66   Temp 98.3 °F (36.8 °C) (Oral)   Resp 12   Ht 5' 7.5\" (1.715 m)   Wt 134 lb (60.8 kg)   SpO2 96%   BMI 20.68 kg/m²         General: Well-developed, well-nourished. In no distress. A&O x 3. Head: Normocephalic, atraumatic. Eyes: Conjunctiva clear. Pupils equal, round, reactive to light. Extraocular movements intact. Ears/Nose:  Nares normal. Septum midline. Normal nasal mucosa. No drainage or sinus tenderness. Mouth/Throat: Lips, mucosa, and tongue normal. Oropharynx benign. Neck: Supple, symmetrical, trachea midline, no lymphadenopathy, no carotid bruits, no JVD, thyroid: not enlarged, symmetric, no tenderness/mass/nodules. Lungs: Clear to auscultation bilaterally. No crackles or wheezes. No use of accessory muscles. Speaks in full sentences without SOB. Chest Wall: No tenderness or deformity. Heart: RRR, normal S1 and S2, no murmur, click, rub, or gallop. Back: Symmetric. ROM intact. No CVA tenderness. Abdomen: Soft, non-distended  Skin: No rashes or lesions. Neurological: CN II-XII intact. Normal strength, sensation, and reflexes throughout. Neurovasc: No edema appreciated. Dorsalis pedis pulses are 2+ on the right side, and 2+ on the left side. Posterior tibial pulses are 2+ on the right side, and 2+ on the left side. Musculoskeletal: Gait normal. ROM normal at both knees and shoulders. Psychiatric: Normal mood and affect. Behavior is normal.       No results found for any visits on 06/09/22. ASSESSMENT and PLAN  Diagnoses and all orders for this visit:    1. Well adult exam  -     CBC WITH AUTOMATED DIFF;  Future  - METABOLIC PANEL, COMPREHENSIVE; Future  -     LIPID PANEL; Future            Patient Instructions        Well Visit, Women 48 to 72: Care Instructions  Overview     Well visits can help you stay healthy. Your doctor has checked your overall health and may have suggested ways to take good care of yourself. Your doctor also may have recommended tests. At home, you can help prevent illness with healthy eating, regular exercise, and other steps. Follow-up care is a key part of your treatment and safety. Be sure to make and go to all appointments, and call your doctor if you are having problems. It's also a good idea to know your test results and keep a list of the medicines you take. How can you care for yourself at home? · Get screening tests that you and your doctor decide on. Screening helps find diseases before any symptoms appear. · Eat healthy foods. Choose fruits, vegetables, whole grains, protein, and low-fat dairy foods. Limit fat, especially saturated fat. Reduce salt in your diet. · Limit alcohol. Have no more than 1 drink a day or 7 drinks a week. · Get at least 30 minutes of exercise on most days of the week. Walking is a good choice. You also may want to do other activities, such as running, swimming, cycling, or playing tennis or team sports. · Reach and stay at a healthy weight. This will lower your risk for many problems, such as obesity, diabetes, heart disease, and high blood pressure. · Do not smoke. Smoking can make health problems worse. If you need help quitting, talk to your doctor about stop-smoking programs and medicines. These can increase your chances of quitting for good. · Care for your mental health. It is easy to get weighed down by worry and stress. Learn strategies to manage stress, like deep breathing and mindfulness, and stay connected with your family and community. If you find you often feel sad or hopeless, talk with your doctor. Treatment can help.   · Talk to your doctor about whether you have any risk factors for sexually transmitted infections (STIs). You can help prevent STIs if you wait to have sex with a new partner (or partners) until you've each been tested for STIs. It also helps if you use condoms (male or female condoms) and if you limit your sex partners to one person who only has sex with you. Vaccines are available for some STIs. · If you think you may have a problem with alcohol or drug use, talk to your doctor. This includes prescription medicines (such as amphetamines and opioids) and illegal drugs (such as cocaine and methamphetamine). Your doctor can help you figure out what type of treatment is best for you. · Protect your skin from too much sun. When you're outdoors from 10 a.m. to 4 p.m., stay in the shade or cover up with clothing and a hat with a wide brim. Wear sunglasses that block UV rays. Even when it's cloudy, put broad-spectrum sunscreen (SPF 30 or higher) on any exposed skin. · See a dentist one or two times a year for checkups and to have your teeth cleaned. · Wear a seat belt in the car. When should you call for help? Watch closely for changes in your health, and be sure to contact your doctor if you have any problems or symptoms that concern you. Where can you learn more? Go to http://www.gray.com/  Enter [id number] in the search box to learn more about \"Well Visit, Women 50 to 72: Care Instructions. \"  Current as of: October 6, 2021               Content Version: 13.2  © 2006-2022 [employer]. Care instructions adapted under license by [employer] (which disclaims liability or warranty for this information). If you have questions about a medical condition or this instruction, always ask your healthcare professional. [place] [address] any warranty or liability for your use of this information.           Please keep your follow-up appointment with Marvin Cuba MD. Health Maintenance Due   Topic Date Due    COVID-19 Vaccine (1) Never done    Shingrix Vaccine Age 50> (1 of 2) Never done    DTaP/Tdap/Td series (2 - Td or Tdap) 01/08/2018    Breast Cancer Screen Mammogram  02/23/2022    Depression Screen  05/03/2022       I have discussed the diagnosis with the patient and the intended plan as seen in the above orders. Patient is in agreement. The patient has received an after-visit summary and questions were answered concerning future plans. I have discussed medication side effects and warnings with the patient as well. Warning signs for the above conditions were discussed including when to call our office or go to the emergency room. The nurse provided the patient and/or family with advanced directive information if needed and encouraged the patient to provide a copy to the office when available.

## 2022-06-09 ENCOUNTER — OFFICE VISIT (OUTPATIENT)
Dept: INTERNAL MEDICINE CLINIC | Age: 57
End: 2022-06-09
Payer: COMMERCIAL

## 2022-06-09 VITALS
HEIGHT: 68 IN | WEIGHT: 134 LBS | TEMPERATURE: 98.3 F | HEART RATE: 66 BPM | DIASTOLIC BLOOD PRESSURE: 83 MMHG | OXYGEN SATURATION: 96 % | RESPIRATION RATE: 12 BRPM | BODY MASS INDEX: 20.31 KG/M2 | SYSTOLIC BLOOD PRESSURE: 120 MMHG

## 2022-06-09 DIAGNOSIS — Z00.00 WELL ADULT EXAM: Primary | ICD-10-CM

## 2022-06-09 PROCEDURE — 99396 PREV VISIT EST AGE 40-64: CPT | Performed by: NURSE PRACTITIONER

## 2022-06-09 NOTE — PROGRESS NOTES
Roni Degroot  Identified pt with two pt identifiers(name and ). Chief Complaint   Patient presents with    Physical       Reviewed record In preparation for visit and have obtained necessary documentation. 1. Have you been to the ER, urgent care clinic or hospitalized since your last visit? No     2. Have you seen or consulted any other health care providers outside of the [address] [place] [address] since your last visit? Include any pap smears or colon screening. No    Vitals reviewed with provider.     Health Maintenance reviewed:     Health Maintenance Due   Topic    COVID-19 Vaccine (1)    Shingrix Vaccine Age 49> (1 of 2)    DTaP/Tdap/Td series (2 - Td or Tdap)    Breast Cancer Screen Mammogram     Depression Screen           Wt Readings from Last 3 Encounters:   22 134 lb (60.8 kg)   21 136 lb 9.6 oz (62 kg)   20 137 lb (62.1 kg)        Temp Readings from Last 3 Encounters:   22 98.3 °F (36.8 °C) (Oral)   21 98 °F (36.7 °C) (Oral)   20 99.2 °F (37.3 °C)        BP Readings from Last 3 Encounters:   22 120/83   21 105/73   20 125/84        Pulse Readings from Last 3 Encounters:   22 66   21 69   20 85        Vitals:    22 1413   BP: 120/83   Pulse: 66   Resp: 12   Temp: 98.3 °F (36.8 °C)   TempSrc: Oral   SpO2: 96%   Weight: 134 lb (60.8 kg)   Height: 5' 7.5\" (1.715 m)   PainSc:   0 - No pain          Learning Assessment:   :       Learning Assessment 2015   PRIMARY LEARNER Patient   HIGHEST LEVEL OF EDUCATION - PRIMARY LEARNER  [employer]   BARRIERS PRIMARY LEARNER NONE   CO-LEARNER CAREGIVER No   PRIMARY LANGUAGE ENGLISH   LEARNER PREFERENCE PRIMARY DEMONSTRATION   ANSWERED BY patient   RELATIONSHIP SELF        Depression Screening:   :       3 most recent PHQ Screens 2022   Little interest or pleasure in doing things Not at all   Feeling down, depressed, irritable, or hopeless Not at all   Total Score PHQ 2 0        Fall Risk Assessment:   :       Fall Risk Assessment, last 12 mths 12/12/2018   Able to walk? Yes   Fall in past 12 months? No        Abuse Screening:   :       Abuse Screening Questionnaire 12/12/2018   Do you ever feel afraid of your partner? N   Are you in a relationship with someone who physically or mentally threatens you? N   Is it safe for you to go home?  Y        ADL Screening:   :       ADL Assessment 1/3/2020   Feeding yourself No Help Needed   Getting from bed to chair No Help Needed   Getting dressed No Help Needed   Bathing or showering No Help Needed   Walk across the room (includes cane/walker) No Help Needed   Using the telphone No Help Needed   Taking your medications No Help Needed   Preparing meals No Help Needed   Managing money (expenses/bills) No Help Needed   Moderately strenuous housework (laundry) No Help Needed   Shopping for personal items (toiletries/medicines) No Help Needed   Shopping for groceries No Help Needed   Driving No Help Needed   Climbing a flight of stairs No Help Needed   Getting to places beyond walking distances No Help Needed

## 2022-06-09 NOTE — PATIENT INSTRUCTIONS

## 2022-07-12 LAB
ALBUMIN SERPL-MCNC: 4.5 G/DL (ref 3.8–4.9)
ALBUMIN/GLOB SERPL: 1.3 {RATIO} (ref 1.2–2.2)
ALP SERPL-CCNC: 104 IU/L (ref 44–121)
ALT SERPL-CCNC: 18 IU/L (ref 0–32)
AST SERPL-CCNC: 21 IU/L (ref 0–40)
BASOPHILS # BLD AUTO: 0 X10E3/UL (ref 0–0.2)
BASOPHILS NFR BLD AUTO: 1 %
BILIRUB SERPL-MCNC: 0.6 MG/DL (ref 0–1.2)
BUN SERPL-MCNC: 10 MG/DL (ref 6–24)
BUN/CREAT SERPL: 13 (ref 9–23)
CALCIUM SERPL-MCNC: 9.8 MG/DL (ref 8.7–10.2)
CHLORIDE SERPL-SCNC: 100 MMOL/L (ref 96–106)
CHOLEST SERPL-MCNC: 221 MG/DL (ref 100–199)
CO2 SERPL-SCNC: 26 MMOL/L (ref 20–29)
CREAT SERPL-MCNC: 0.76 MG/DL (ref 0.57–1)
EGFR: 90 ML/MIN/1.73
EOSINOPHIL # BLD AUTO: 0.1 X10E3/UL (ref 0–0.4)
EOSINOPHIL NFR BLD AUTO: 2 %
ERYTHROCYTE [DISTWIDTH] IN BLOOD BY AUTOMATED COUNT: 12.6 % (ref 11.7–15.4)
GLOBULIN SER CALC-MCNC: 3.4 G/DL (ref 1.5–4.5)
GLUCOSE SERPL-MCNC: 82 MG/DL (ref 65–99)
HCT VFR BLD AUTO: 35.7 % (ref 34–46.6)
HDLC SERPL-MCNC: 80 MG/DL
HGB BLD-MCNC: 11.5 G/DL (ref 11.1–15.9)
IMM GRANULOCYTES # BLD AUTO: 0 X10E3/UL (ref 0–0.1)
IMM GRANULOCYTES NFR BLD AUTO: 0 %
LDLC SERPL CALC-MCNC: 129 MG/DL (ref 0–99)
LYMPHOCYTES # BLD AUTO: 2.3 X10E3/UL (ref 0.7–3.1)
LYMPHOCYTES NFR BLD AUTO: 44 %
MCH RBC QN AUTO: 26.9 PG (ref 26.6–33)
MCHC RBC AUTO-ENTMCNC: 32.2 G/DL (ref 31.5–35.7)
MCV RBC AUTO: 84 FL (ref 79–97)
MONOCYTES # BLD AUTO: 0.3 X10E3/UL (ref 0.1–0.9)
MONOCYTES NFR BLD AUTO: 6 %
NEUTROPHILS # BLD AUTO: 2.5 X10E3/UL (ref 1.4–7)
NEUTROPHILS NFR BLD AUTO: 47 %
PLATELET # BLD AUTO: 249 X10E3/UL (ref 150–450)
POTASSIUM SERPL-SCNC: 4.3 MMOL/L (ref 3.5–5.2)
PROT SERPL-MCNC: 7.9 G/DL (ref 6–8.5)
RBC # BLD AUTO: 4.27 X10E6/UL (ref 3.77–5.28)
SODIUM SERPL-SCNC: 140 MMOL/L (ref 134–144)
TRIGL SERPL-MCNC: 69 MG/DL (ref 0–149)
VLDLC SERPL CALC-MCNC: 12 MG/DL (ref 5–40)
WBC # BLD AUTO: 5.2 X10E3/UL (ref 3.4–10.8)

## 2022-07-12 NOTE — PROGRESS NOTES
Pls let patient know her blood sugar, kidney, and liver labs are all normal. Keep up the good work! Please let patient know her cholesterol is elevated. Elevated cholesterol increases risk for heart disease, heart attack and stroke. Ways to decrease cholesterol include decreasing saturated fats in the diet found in many packaged foods and fried foods, increasing exercise (whcih helps raise our good protective cholesterol) and weight loss if indicated. Eating a diet high in vegetables and fruits is also helpful.        The 10-year ASCVD risk score (Rebecca Riddle et al., 2013) is: 3.3%    Values used to calculate the score:      Age: 59 years      Sex: Female      Is Non- : Yes      Diabetic: No      Tobacco smoker: No      Systolic Blood Pressure: 330 mmHg      Is BP treated: No      HDL Cholesterol: 80 mg/dL      Total Cholesterol: 221 mg/dL

## 2022-11-05 ENCOUNTER — APPOINTMENT (OUTPATIENT)
Dept: CT IMAGING | Age: 57
End: 2022-11-05
Attending: PHYSICIAN ASSISTANT
Payer: COMMERCIAL

## 2022-11-05 ENCOUNTER — HOSPITAL ENCOUNTER (EMERGENCY)
Age: 57
Discharge: HOME | End: 2022-11-05
Attending: EMERGENCY MEDICINE
Payer: COMMERCIAL

## 2022-11-05 VITALS
SYSTOLIC BLOOD PRESSURE: 119 MMHG | HEART RATE: 66 BPM | HEIGHT: 68 IN | DIASTOLIC BLOOD PRESSURE: 76 MMHG | TEMPERATURE: 98.2 F | BODY MASS INDEX: 20.15 KG/M2 | WEIGHT: 132.94 LBS | RESPIRATION RATE: 14 BRPM | OXYGEN SATURATION: 97 %

## 2022-11-05 DIAGNOSIS — N30.90 CYSTITIS: Primary | ICD-10-CM

## 2022-11-05 DIAGNOSIS — R10.9 LEFT FLANK PAIN: ICD-10-CM

## 2022-11-05 DIAGNOSIS — N20.0 KIDNEY STONE: ICD-10-CM

## 2022-11-05 DIAGNOSIS — K80.20 CALCULUS OF GALLBLADDER WITHOUT CHOLECYSTITIS WITHOUT OBSTRUCTION: ICD-10-CM

## 2022-11-05 LAB
APPEARANCE UR: CLEAR
BACTERIA URNS QL MICRO: NEGATIVE /HPF
BILIRUB UR QL: NEGATIVE
COLOR UR: NORMAL
EPITH CASTS URNS QL MICRO: NORMAL /LPF
GLUCOSE UR STRIP.AUTO-MCNC: NEGATIVE MG/DL
HGB UR QL STRIP: NEGATIVE
KETONES UR QL STRIP.AUTO: NEGATIVE MG/DL
LEUKOCYTE ESTERASE UR QL STRIP.AUTO: NEGATIVE
NITRITE UR QL STRIP.AUTO: NEGATIVE
PH UR STRIP: 6.5 [PH] (ref 5–8)
PROT UR STRIP-MCNC: NEGATIVE MG/DL
RBC #/AREA URNS HPF: NORMAL /HPF (ref 0–5)
SP GR UR REFRACTOMETRY: 1.02
UA: UC IF INDICATED,UAUC: NORMAL
UROBILINOGEN UR QL STRIP.AUTO: 1 EU/DL (ref 0.2–1)
WBC URNS QL MICRO: NORMAL /HPF (ref 0–4)

## 2022-11-05 PROCEDURE — 74176 CT ABD & PELVIS W/O CONTRAST: CPT

## 2022-11-05 PROCEDURE — 81001 URINALYSIS AUTO W/SCOPE: CPT

## 2022-11-05 PROCEDURE — 99284 EMERGENCY DEPT VISIT MOD MDM: CPT

## 2022-11-05 PROCEDURE — 74011250637 HC RX REV CODE- 250/637: Performed by: PHYSICIAN ASSISTANT

## 2022-11-05 RX ORDER — ACETAMINOPHEN 500 MG
1000 TABLET ORAL
Status: COMPLETED | OUTPATIENT
Start: 2022-11-05 | End: 2022-11-05

## 2022-11-05 RX ORDER — CEPHALEXIN 500 MG/1
500 CAPSULE ORAL 2 TIMES DAILY
Qty: 14 CAPSULE | Refills: 0 | Status: SHIPPED | OUTPATIENT
Start: 2022-11-05 | End: 2022-11-12

## 2022-11-05 RX ORDER — CEPHALEXIN 250 MG/1
500 CAPSULE ORAL
Status: COMPLETED | OUTPATIENT
Start: 2022-11-05 | End: 2022-11-05

## 2022-11-05 RX ORDER — OXYCODONE AND ACETAMINOPHEN 5; 325 MG/1; MG/1
1 TABLET ORAL
Status: DISCONTINUED | OUTPATIENT
Start: 2022-11-05 | End: 2022-11-05

## 2022-11-05 RX ADMIN — ACETAMINOPHEN 1000 MG: 500 TABLET ORAL at 18:05

## 2022-11-05 RX ADMIN — CEPHALEXIN 500 MG: 250 CAPSULE ORAL at 19:45

## 2022-11-05 NOTE — DISCHARGE INSTRUCTIONS
There were incidental gallstones noted on your CT scan today. I do not think these are contributing to your pain but schedule a follow up with your primary care doctor for a recheck.

## 2022-11-05 NOTE — ED PROVIDER NOTES
EMERGENCY DEPARTMENT HISTORY AND PHYSICAL EXAM      Date: 11/5/2022  Patient Name: Onesimo Lowery    History of Presenting Illness     Chief Complaint   Patient presents with    Back Pain     Three weeks of non traumatic back pain mostly on left side, pain worse if has to stand on her feet for a long time. Sometimes it feels like something moving back on lower back. Was treated by urgent care with prednisone and muscle relaxers but it didn't help. Denies dysuria. History Provided By: Patient and Patient's     HPI: Onesimo Lowery, 59 y.o. female with no significant past medical history, presents to the ED with cc of back pain. About 3 weeks ago, the patient began having a pain to the left side of her back. It is worse with standing and movement. She reports it feels like there is something \"moving around in there\". She has a history of kidney stones and says this feels the same. She was seen in urgent care when it started and was prescribed prednisone and muscle relaxers but they are not helping. She denies injury, radiation into abdomen or legs, lower extremity numbness or weakness, bowel or bladder dysfunction, saddle anesthesia, dysuria, hematuria, fever, nausea, vomiting. There are no other complaints, changes, or physical findings at this time. PCP: Anatoly Smyth MD    No current facility-administered medications on file prior to encounter. Current Outpatient Medications on File Prior to Encounter   Medication Sig Dispense Refill    ergocalciferol, vitamin D2, (VITAMIN D2 PO) Take  by mouth. (Patient not taking: Reported on 6/9/2022)      Cetirizine (ZYRTEC) 10 mg cap Take  by mouth. Past History     Past Medical History:  No past medical history on file.     Past Surgical History:  Past Surgical History:   Procedure Laterality Date    HX HYSTERECTOMY  2009    AK MYOMECTOMY 1-4,W/TOT 250GMS/<,ABD APPRCH         Family History:  Family History   Problem Relation Age of Onset    Hypertension Mother     Hypertension Father     Glaucoma Father        Social History:  Social History     Tobacco Use    Smoking status: Never    Smokeless tobacco: Never   Vaping Use    Vaping Use: Never used   Substance Use Topics    Alcohol use: Yes     Comment: rare     Drug use: No       Allergies: Allergies   Allergen Reactions    Naproxen Sodium Nausea and Vomiting    Other Medication Other (comments)     ? antibiotic         Review of Systems   Review of Systems   Constitutional:  Negative for chills and fever. HENT:  Negative for ear pain and sore throat. Eyes:  Negative for redness and visual disturbance. Respiratory:  Negative for cough and shortness of breath. Cardiovascular:  Negative for chest pain and palpitations. Gastrointestinal:  Negative for abdominal pain, nausea and vomiting. Genitourinary:  Negative for dysuria and hematuria. Musculoskeletal:  Positive for back pain. Negative for gait problem. Skin:  Negative for rash and wound. Neurological:  Negative for dizziness and headaches. Psychiatric/Behavioral:  Negative for behavioral problems and confusion. All other systems reviewed and are negative. Physical Exam   Physical Exam  Vitals and nursing note reviewed. Constitutional:       Appearance: She is not toxic-appearing. HENT:      Head: Normocephalic and atraumatic. Mouth/Throat:      Mouth: Mucous membranes are moist.   Eyes:      Extraocular Movements: Extraocular movements intact. Pupils: Pupils are equal, round, and reactive to light. Cardiovascular:      Rate and Rhythm: Normal rate and regular rhythm. Pulmonary:      Effort: Pulmonary effort is normal. No respiratory distress. Abdominal:      Comments: Abdomen is soft. No tenderness to palpation. Mild right CVA tenderness to percussion. Musculoskeletal:         General: No deformity. Normal range of motion. Cervical back: Normal range of motion and neck supple. Comments: Tenderness to palpation of the left lower thoracic paraspinal muscles. No significant midline tenderness. Skin:     General: Skin is warm and dry. Neurological:      General: No focal deficit present. Mental Status: She is alert and oriented to person, place, and time. Sensory: No sensory deficit. Motor: No weakness. Psychiatric:         Behavior: Behavior normal.         Diagnostic Study Results     Labs -     Recent Results (from the past 12 hour(s))   URINALYSIS W/ REFLEX CULTURE    Collection Time: 11/05/22  4:01 PM    Specimen: Urine   Result Value Ref Range    Color YELLOW/STRAW      Appearance CLEAR CLEAR      Specific gravity 1.022      pH (UA) 6.5 5.0 - 8.0      Protein Negative NEG mg/dL    Glucose Negative NEG mg/dL    Ketone Negative NEG mg/dL    Bilirubin Negative NEG      Blood Negative NEG      Urobilinogen 1.0 0.2 - 1.0 EU/dL    Nitrites Negative NEG      Leukocyte Esterase Negative NEG      WBC 0-4 0 - 4 /hpf    RBC 0-5 0 - 5 /hpf    Epithelial cells FEW FEW /lpf    Bacteria Negative NEG /hpf    UA:UC IF INDICATED CULTURE NOT INDICATED BY UA RESULT CNI         Radiologic Studies -   CT ABD PELV WO CONT   Final Result   No renal, ureteral, or bladder calculus. Cystitis   Incidental cholelithiasis        CT Results  (Last 48 hours)                 11/05/22 1814  CT ABD PELV WO CONT Final result    Impression:  No renal, ureteral, or bladder calculus. Cystitis   Incidental cholelithiasis       Narrative:  EXAM: CT ABD PELV WO CONT       INDICATION: left flank pain, history of kidney stones       COMPARISON: 2009       IV CONTRAST: None. ORAL CONTRAST: None       TECHNIQUE:    Thin axial images were obtained through the abdomen and pelvis. Coronal and   sagittal reformats were generated. CT dose reduction was achieved through use of   a standardized protocol tailored for this examination and automatic exposure   control for dose modulation.         The absence of intravenous contrast material reduces the sensitivity for   evaluation of the vasculature and solid organs. FINDINGS:    LOWER THORAX: No significant abnormality in the incidentally imaged lower chest.   LIVER: No mass. BILIARY TREE: Multiple gallstones CBD is not dilated. SPLEEN: within normal limits. PANCREAS: No focal abnormality. ADRENALS: Unremarkable. KIDNEYS/URETERS: 2 mm stone in the right upper kidney (image 29). STOMACH: Unremarkable. SMALL BOWEL: No dilatation or wall thickening. COLON: No dilatation or wall thickening. APPENDIX: Normal on axial image 51   PERITONEUM: No ascites or pneumoperitoneum. RETROPERITONEUM: No lymphadenopathy or aortic aneurysm. REPRODUCTIVE ORGANS:   URINARY BLADDER: No mass or calculus. Diffuse mild thickening of the bladder. BONES: No destructive bone lesion. ABDOMINAL WALL: No mass or hernia. ADDITIONAL COMMENTS: N/A                 CXR Results  (Last 48 hours)      None              Medical Decision Making   I am the first provider for this patient. I reviewed the vital signs, available nursing notes, past medical history, past surgical history, family history and social history. Vital Signs-Reviewed the patient's vital signs. Patient Vitals for the past 12 hrs:   Temp Pulse Resp BP SpO2   11/05/22 1553 98.2 °F (36.8 °C) 66 14 119/76 97 %         Records Reviewed: Nursing Notes and Old Medical Records      Provider Notes (Medical Decision Making):   DDx: Muscle strain/spasm, kidney stone, pyelonephritis    This is a 64-year-old female who presents with back pain. She has no back pain red flag signs and neurologic exam is intact. She has reproducible tenderness to palpation of the paraspinal muscles. She does report history of kidney stones and says this feels the same so we will obtain CT abdomen and pelvis for further evaluation and treat symptomatically. ED Course:   Initial assessment performed.  The patients presenting problems have been discussed, and they are in agreement with the care plan formulated and outlined with them. I have encouraged them to ask questions as they arise throughout their visit. Reassessed patient, she declines any analgesia other than Tylenol. Discussed that there is a small intrarenal stone, cystitis, and incidental gallstones. We will treat cystitis with antibiotics. Discussed follow-up and return precautions. Disposition:  7:29 PM  The patient has been re-evaluated and is ready for discharge. Reviewed available results with patient. Counseled patient on diagnosis and care plan. Patient has expressed understanding, and all questions have been answered. Patient agrees with plan and agrees to follow up as recommended, or to return to the ED if their symptoms worsen. Discharge instructions have been provided and explained to the patient, along with reasons to return to the ED. PLAN:  1. Discharge Medication List as of 11/5/2022  7:29 PM        START taking these medications    Details   cephALEXin (Keflex) 500 mg capsule Take 1 Capsule by mouth two (2) times a day for 7 days. , Normal, Disp-14 Capsule, R-0           CONTINUE these medications which have NOT CHANGED    Details   ergocalciferol, vitamin D2, (VITAMIN D2 PO) Take  by mouth., Historical Med      Cetirizine (ZYRTEC) 10 mg cap Take  by mouth., Historical Med           2. Follow-up Information       Follow up With Specialties Details Why Contact Info    Caro Payan MD Internal Medicine Physician Schedule an appointment as soon as possible for a visit in 3 days  [address] [place] [address]  [phone]      [facility] EMERGENCY DEPT Emergency Medicine Go to  If symptoms worsen [address] [place] [address]  [phone]          Return to ED if worse     Diagnosis     Clinical Impression:   1. Cystitis    2. Left flank pain    3. Kidney stone    4.  Calculus of gallbladder without cholecystitis without obstruction            Naaby Leonard.  SEBLE Aiken  11/06/22 9:25 PM

## 2022-11-11 ENCOUNTER — OFFICE VISIT (OUTPATIENT)
Dept: INTERNAL MEDICINE CLINIC | Age: 57
End: 2022-11-11
Payer: COMMERCIAL

## 2022-11-11 VITALS
DIASTOLIC BLOOD PRESSURE: 74 MMHG | HEIGHT: 67 IN | WEIGHT: 134.8 LBS | TEMPERATURE: 98.6 F | HEART RATE: 67 BPM | BODY MASS INDEX: 21.16 KG/M2 | OXYGEN SATURATION: 96 % | SYSTOLIC BLOOD PRESSURE: 111 MMHG | RESPIRATION RATE: 12 BRPM

## 2022-11-11 DIAGNOSIS — M54.6 ACUTE MIDLINE THORACIC BACK PAIN: Primary | ICD-10-CM

## 2022-11-11 DIAGNOSIS — M54.50 LUMBAR PAIN: ICD-10-CM

## 2022-11-11 DIAGNOSIS — N32.89 BLADDER WALL THICKENING: ICD-10-CM

## 2022-11-11 DIAGNOSIS — R10.9 ABDOMINAL CRAMPING: ICD-10-CM

## 2022-11-11 DIAGNOSIS — N20.0 KIDNEY STONE ON LEFT SIDE: ICD-10-CM

## 2022-11-11 PROCEDURE — 99214 OFFICE O/P EST MOD 30 MIN: CPT | Performed by: INTERNAL MEDICINE

## 2022-11-11 RX ORDER — DICLOFENAC SODIUM 50 MG/1
50 TABLET, DELAYED RELEASE ORAL
Qty: 60 TABLET | Refills: 0 | Status: SHIPPED | OUTPATIENT
Start: 2022-11-11

## 2022-11-11 NOTE — PROGRESS NOTES
Mckenna David  Identified pt with two pt identifiers(name and ). Chief Complaint   Patient presents with    Hypertension    Back Pain       Reviewed record In preparation for visit and have obtained necessary documentation. 1. Have you been to the ER, urgent care clinic or hospitalized since your last visit? No     2. Have you seen or consulted any other health care providers outside of the [address] [place] [address] since your last visit? Include any pap smears or colon screening. No    Vitals reviewed with provider.     Health Maintenance reviewed:     Health Maintenance Due   Topic    Shingrix Vaccine Age 50> (1 of 2)    DTaP/Tdap/Td series (2 - Td or Tdap)    COVID-19 Vaccine (4 - Booster for Moderna series)    Flu Vaccine (1)          Wt Readings from Last 3 Encounters:   22 134 lb 12.8 oz (61.1 kg)   22 132 lb 15 oz (60.3 kg)   22 134 lb (60.8 kg)        Temp Readings from Last 3 Encounters:   22 98.6 °F (37 °C) (Oral)   22 98.2 °F (36.8 °C)   22 98.3 °F (36.8 °C) (Oral)        BP Readings from Last 3 Encounters:   22 111/74   22 119/76   22 120/83        Pulse Readings from Last 3 Encounters:   22 67   22 66   22 66        Vitals:    22 1136   BP: 111/74   Pulse: 67   Resp: 12   Temp: 98.6 °F (37 °C)   TempSrc: Oral   SpO2: 96%   Weight: 134 lb 12.8 oz (61.1 kg)   Height: 5' 7\" (1.702 m)   PainSc:   5   PainLoc: Back          Learning Assessment:   :       Learning Assessment 2015   PRIMARY LEARNER Patient   HIGHEST LEVEL OF EDUCATION - PRIMARY LEARNER  [employer]   BARRIERS PRIMARY LEARNER NONE   CO-LEARNER CAREGIVER No   PRIMARY LANGUAGE ENGLISH   LEARNER PREFERENCE PRIMARY DEMONSTRATION   ANSWERED BY patient   RELATIONSHIP SELF        Depression Screening:   :       3 most recent PHQ Screens 2022   Little interest or pleasure in doing things Not at all   Feeling down, depressed, irritable, or hopeless Not at all   Total Score PHQ 2 0        Fall Risk Assessment:   :       Fall Risk Assessment, last 12 mths 12/12/2018   Able to walk? Yes   Fall in past 12 months? No        Abuse Screening:   :       Abuse Screening Questionnaire 12/12/2018   Do you ever feel afraid of your partner? N   Are you in a relationship with someone who physically or mentally threatens you? N   Is it safe for you to go home?  Y        ADL Screening:   :       ADL Assessment 1/3/2020   Feeding yourself No Help Needed   Getting from bed to chair No Help Needed   Getting dressed No Help Needed   Bathing or showering No Help Needed   Walk across the room (includes cane/walker) No Help Needed   Using the telphone No Help Needed   Taking your medications No Help Needed   Preparing meals No Help Needed   Managing money (expenses/bills) No Help Needed   Moderately strenuous housework (laundry) No Help Needed   Shopping for personal items (toiletries/medicines) No Help Needed   Shopping for groceries No Help Needed   Driving No Help Needed   Climbing a flight of stairs No Help Needed   Getting to places beyond walking distances No Help Needed

## 2022-11-11 NOTE — PROGRESS NOTES
HPI  Ms. Chacho Borrego is a 59y.o. year old female, she is seen today for ER follow-up. She was seen at the emergency room on November 5 after being treated with prednisone and muscle relaxants by urgent care without improvement. UA was normal but she was treated with Keflex for \"cystitis\". CT abdomen showed 2 mm stone in the right upper kidney. Also diffuse mild thickening of the bladder. Also multiple gallstones without CBD dilation. Pain no better since ER visit. Started couple weeks ago, left sided, pain moves to different areas of left lower back. No weakness, numbness or tingling in legs. No n/v/abd pain. Occasional cramping in abdomen since this started. No diarrhea. Cramping goes away on its own. Food makes it worse. Pain worse when on her feet for certain amount of time, when sits down goes away. Salonpas helps. No rash. No urinary urgency, frequency, dysuria. Has been avoiding lactose due to intolerance and no issues when avoids it. Prednisone and muscle relaxant didn't help back pain. Chief Complaint   Patient presents with    Hypertension    Back Pain        Prior to Admission medications    Medication Sig Start Date End Date Taking? Authorizing Provider   diclofenac EC (VOLTAREN) 50 mg EC tablet Take 1 Tablet by mouth two (2) times daily as needed for Pain. 11/11/22  Yes Fatemeh Weston MD   cephALEXin (Keflex) 500 mg capsule Take 1 Capsule by mouth two (2) times a day for 7 days. 11/5/22 11/12/22 Yes Praneeth Aiken PA   Cetirizine 10 mg cap Take  by mouth. Yes Provider, Historical   ergocalciferol, vitamin D2, (VITAMIN D2 PO) Take  by mouth. Patient not taking: No sig reported  11/11/22  Provider, Historical         Allergies   Allergen Reactions    Naproxen Sodium Nausea and Vomiting    Other Medication Other (comments)     ?  antibiotic         REVIEW OF SYSTEMS:  Per HPI    PHYSICAL EXAM:  Visit Vitals  /74 (BP 1 Location: Left upper arm, BP Patient Position: Sitting)   Pulse 67   Temp 98.6 °F (37 °C) (Oral)   Resp 12   Ht 5' 7\" (1.702 m)   Wt 134 lb 12.8 oz (61.1 kg)   SpO2 96%   BMI 21.11 kg/m²     Constitutional: Appears well-developed and well-nourished. No distress. HENT:   Head: Normocephalic and atraumatic. Eyes: No scleral icterus. Cardiovascular: Normal S1/S2, regular rhythm. No murmurs, rubs, or gallops. Pulmonary/Chest: Effort normal and breath sounds normal. No respiratory distress. No wheezes, rhonchi, or rales. Abdomen: Soft, NT/ND, +BS, no rebound or guarding, no masses, no HSM appreciated. Back: +mild tenderness to palpation thoracic and lumbar spine, FROM with mild pain left side with lateral bending and twisting to left  Ext: No edema. Neurological: Alert. Strength 5/5 b/l LE, SLR neg b/l  Psychiatric: Normal mood and affect. Behavior is normal.     Lab Results   Component Value Date/Time    Sodium 140 07/11/2022 11:12 AM    Potassium 4.3 07/11/2022 11:12 AM    Chloride 100 07/11/2022 11:12 AM    CO2 26 07/11/2022 11:12 AM    Anion gap 5 10/27/2017 08:34 PM    Glucose 82 07/11/2022 11:12 AM    BUN 10 07/11/2022 11:12 AM    Creatinine 0.76 07/11/2022 11:12 AM    BUN/Creatinine ratio 13 07/11/2022 11:12 AM    GFR est  01/08/2020 09:30 AM    GFR est non-AA 98 01/08/2020 09:30 AM    Calcium 9.8 07/11/2022 11:12 AM    Bilirubin, total 0.6 07/11/2022 11:12 AM    Alk.  phosphatase 104 07/11/2022 11:12 AM    Protein, total 7.9 07/11/2022 11:12 AM    Albumin 4.5 07/11/2022 11:12 AM    Globulin 4.9 (H) 10/27/2017 08:34 PM    A-G Ratio 1.3 07/11/2022 11:12 AM    ALT (SGPT) 18 07/11/2022 11:12 AM     No results found for: HBA1C, LTE8QGRF, XTI5EAZK   Lab Results   Component Value Date/Time    Cholesterol, total 221 (H) 07/11/2022 11:12 AM    HDL Cholesterol 80 07/11/2022 11:12 AM    LDL, calculated 129 (H) 07/11/2022 11:12 AM    LDL, calculated 111 (H) 01/08/2020 09:30 AM    VLDL, calculated 12 07/11/2022 11:12 AM    VLDL, calculated 15 01/08/2020 09:30 AM    Triglyceride 69 07/11/2022 11:12 AM          ASSESSMENT/PLAN  Diagnoses and all orders for this visit:    1. Acute midline thoracic back pain  -     XR SPINE THORAC 3 V; Future  -     diclofenac EC (VOLTAREN) 50 mg EC tablet; Take 1 Tablet by mouth two (2) times daily as needed for Pain. 2. Lumbar pain  -     XR SPINE LUMB 2 OR 3 V; Future  -     diclofenac EC (VOLTAREN) 50 mg EC tablet; Take 1 Tablet by mouth two (2) times daily as needed for Pain. 3. Bladder wall thickening  -     REFERRAL TO UROLOGY    4. Kidney stone on left side    5. Abdominal cramping    Kidney stone incidental and not the source of pain. Is been present at least since 2009. No evidence of infection by UA, may stop Keflex. For bladder wall thickening not related to infection will refer to urology. Pain seems musculoskeletal in nature, will try stretches and get films and prescribe diclofenac as needed. Abdominal cramping related to certain foods, monitor. Health Maintenance Due   Topic Date Due    Shingrix Vaccine Age 49> (1 of 2) Never done    DTaP/Tdap/Td series (3 - Td or Tdap) 01/16/2018    COVID-19 Vaccine (4 - Booster for Moderna series) 01/29/2022    Flu Vaccine (1) 08/01/2022        Follow-up and Dispositions    Return if symptoms worsen or fail to improve. Reviewed plan of care. Patient has provided input and agrees with goals. The nurse provided the patient and/or family with advanced directive information if needed and encouraged the patient to provide a copy to the office when available.

## 2023-03-27 DIAGNOSIS — M54.50 LUMBAR PAIN: ICD-10-CM

## 2023-03-27 DIAGNOSIS — M54.6 ACUTE MIDLINE THORACIC BACK PAIN: ICD-10-CM

## 2023-03-27 RX ORDER — DICLOFENAC SODIUM 50 MG/1
TABLET, DELAYED RELEASE ORAL
Qty: 60 TABLET | Refills: 0 | Status: SHIPPED | OUTPATIENT
Start: 2023-03-27

## 2023-05-10 ENCOUNTER — TELEPHONE (OUTPATIENT)
Facility: CLINIC | Age: 58
End: 2023-05-10

## 2023-05-10 DIAGNOSIS — M54.9 CHRONIC MIDLINE BACK PAIN, UNSPECIFIED BACK LOCATION: Primary | ICD-10-CM

## 2023-05-10 DIAGNOSIS — G89.29 CHRONIC MIDLINE BACK PAIN, UNSPECIFIED BACK LOCATION: Primary | ICD-10-CM

## 2023-05-11 NOTE — TELEPHONE ENCOUNTER
Pt called and stated that she is still having really back back pain and the specialist didn't help
Pt given Dr Duncan Guerrero message and number for Dr Vitaliy Appiah.
Pt states she saw urology in April and he did x-rays of her back. Wanted to know what Dr Geneva Delgadillo suggested the next step would be.
Urology simply ruled out kidney issue as source of pain. Only had [facility] which doesn't look for spine issues. Refer to orthopedics for chronic worsening back pain.
98

## 2023-06-23 NOTE — TELEPHONE ENCOUNTER
PCP: Ludy Long MD     Last appt:  11/11/2022    No future appointments.        Requested Prescriptions     Pending Prescriptions Disp Refills    diclofenac (VOLTAREN) 50 MG EC tablet [Pharmacy Med Name: DICLOFENAC SODIUM 50MG TBEC] 60 tablet 0     Sig: TAKE ONE TABLET BY MOUTH TWICE DAILY AS NEEDED FOR PAIN

## 2023-08-16 ENCOUNTER — OFFICE VISIT (OUTPATIENT)
Facility: CLINIC | Age: 58
End: 2023-08-16
Payer: COMMERCIAL

## 2023-08-16 VITALS
WEIGHT: 136 LBS | HEIGHT: 67 IN | SYSTOLIC BLOOD PRESSURE: 134 MMHG | OXYGEN SATURATION: 95 % | DIASTOLIC BLOOD PRESSURE: 88 MMHG | TEMPERATURE: 98.1 F | HEART RATE: 76 BPM | BODY MASS INDEX: 21.35 KG/M2 | RESPIRATION RATE: 16 BRPM

## 2023-08-16 DIAGNOSIS — J30.9 ALLERGIC RHINITIS, UNSPECIFIED SEASONALITY, UNSPECIFIED TRIGGER: Primary | ICD-10-CM

## 2023-08-16 PROCEDURE — 99213 OFFICE O/P EST LOW 20 MIN: CPT | Performed by: INTERNAL MEDICINE

## 2023-08-16 SDOH — ECONOMIC STABILITY: FOOD INSECURITY: WITHIN THE PAST 12 MONTHS, YOU WORRIED THAT YOUR FOOD WOULD RUN OUT BEFORE YOU GOT MONEY TO BUY MORE.: PATIENT DECLINED

## 2023-08-16 SDOH — ECONOMIC STABILITY: HOUSING INSECURITY
IN THE LAST 12 MONTHS, WAS THERE A TIME WHEN YOU DID NOT HAVE A STEADY PLACE TO SLEEP OR SLEPT IN A SHELTER (INCLUDING NOW)?: PATIENT REFUSED

## 2023-08-16 SDOH — ECONOMIC STABILITY: INCOME INSECURITY: HOW HARD IS IT FOR YOU TO PAY FOR THE VERY BASICS LIKE FOOD, HOUSING, MEDICAL CARE, AND HEATING?: PATIENT DECLINED

## 2023-08-16 SDOH — ECONOMIC STABILITY: FOOD INSECURITY: WITHIN THE PAST 12 MONTHS, THE FOOD YOU BOUGHT JUST DIDN'T LAST AND YOU DIDN'T HAVE MONEY TO GET MORE.: PATIENT DECLINED

## 2023-08-16 ASSESSMENT — PATIENT HEALTH QUESTIONNAIRE - PHQ9
SUM OF ALL RESPONSES TO PHQ QUESTIONS 1-9: 0
2. FEELING DOWN, DEPRESSED OR HOPELESS: 0
SUM OF ALL RESPONSES TO PHQ QUESTIONS 1-9: 0
SUM OF ALL RESPONSES TO PHQ9 QUESTIONS 1 & 2: 0
1. LITTLE INTEREST OR PLEASURE IN DOING THINGS: 0

## 2023-08-16 NOTE — PATIENT INSTRUCTIONS
Patient Instructions  Continue cetirizine for allergies. Also start using Flonase Sensimist or Flonase nasal spray.

## 2023-10-05 ENCOUNTER — APPOINTMENT (OUTPATIENT)
Facility: HOSPITAL | Age: 58
End: 2023-10-05
Payer: COMMERCIAL

## 2023-10-05 ENCOUNTER — HOSPITAL ENCOUNTER (EMERGENCY)
Facility: HOSPITAL | Age: 58
Discharge: HOME | End: 2023-10-06
Attending: EMERGENCY MEDICINE
Payer: COMMERCIAL

## 2023-10-05 DIAGNOSIS — R55 SYNCOPE AND COLLAPSE: Primary | ICD-10-CM

## 2023-10-05 DIAGNOSIS — D64.9 MILD CHRONIC ANEMIA: ICD-10-CM

## 2023-10-05 LAB
ALBUMIN SERPL-MCNC: 3.7 G/DL (ref 3.5–5)
ALBUMIN/GLOB SERPL: 0.9 (ref 1.1–2.2)
ALP SERPL-CCNC: 112 U/L (ref 45–117)
ALT SERPL-CCNC: 47 U/L (ref 12–78)
ANION GAP SERPL CALC-SCNC: 6 MMOL/L (ref 5–15)
AST SERPL-CCNC: 34 U/L (ref 15–37)
BASOPHILS # BLD: 0 K/UL (ref 0–0.1)
BASOPHILS NFR BLD: 1 % (ref 0–1)
BILIRUB SERPL-MCNC: 0.4 MG/DL (ref 0.2–1)
BUN SERPL-MCNC: 13 MG/DL (ref 6–20)
BUN/CREAT SERPL: 13 (ref 12–20)
CALCIUM SERPL-MCNC: 9.1 MG/DL (ref 8.5–10.1)
CHLORIDE SERPL-SCNC: 105 MMOL/L (ref 97–108)
CO2 SERPL-SCNC: 29 MMOL/L (ref 21–32)
CREAT SERPL-MCNC: 0.98 MG/DL (ref 0.55–1.02)
DIFFERENTIAL METHOD BLD: ABNORMAL
EOSINOPHIL # BLD: 0 K/UL (ref 0–0.4)
EOSINOPHIL NFR BLD: 1 % (ref 0–7)
ERYTHROCYTE [DISTWIDTH] IN BLOOD BY AUTOMATED COUNT: 13.2 % (ref 11.5–14.5)
GLOBULIN SER CALC-MCNC: 4.3 G/DL (ref 2–4)
GLUCOSE SERPL-MCNC: 95 MG/DL (ref 65–100)
HCT VFR BLD AUTO: 33.3 % (ref 35–47)
HGB BLD-MCNC: 10.4 G/DL (ref 11.5–16)
IMM GRANULOCYTES # BLD AUTO: 0 K/UL (ref 0–0.04)
IMM GRANULOCYTES NFR BLD AUTO: 1 % (ref 0–0.5)
LYMPHOCYTES # BLD: 1.6 K/UL (ref 0.8–3.5)
LYMPHOCYTES NFR BLD: 25 % (ref 12–49)
MCH RBC QN AUTO: 27.4 PG (ref 26–34)
MCHC RBC AUTO-ENTMCNC: 31.2 G/DL (ref 30–36.5)
MCV RBC AUTO: 87.6 FL (ref 80–99)
MONOCYTES # BLD: 0.4 K/UL (ref 0–1)
MONOCYTES NFR BLD: 5 % (ref 5–13)
NEUTS SEG # BLD: 4.5 K/UL (ref 1.8–8)
NEUTS SEG NFR BLD: 67 % (ref 32–75)
NRBC # BLD: 0 K/UL (ref 0–0.01)
NRBC BLD-RTO: 0 PER 100 WBC
PLATELET # BLD AUTO: 226 K/UL (ref 150–400)
PMV BLD AUTO: 10.6 FL (ref 8.9–12.9)
POTASSIUM SERPL-SCNC: 3.7 MMOL/L (ref 3.5–5.1)
PROT SERPL-MCNC: 8 G/DL (ref 6.4–8.2)
RBC # BLD AUTO: 3.8 M/UL (ref 3.8–5.2)
SODIUM SERPL-SCNC: 140 MMOL/L (ref 136–145)
TROPONIN I SERPL HS-MCNC: 5 NG/L (ref 0–51)
WBC # BLD AUTO: 6.6 K/UL (ref 3.6–11)

## 2023-10-05 PROCEDURE — 71045 X-RAY EXAM CHEST 1 VIEW: CPT

## 2023-10-05 PROCEDURE — 36415 COLL VENOUS BLD VENIPUNCTURE: CPT

## 2023-10-05 PROCEDURE — 96361 HYDRATE IV INFUSION ADD-ON: CPT

## 2023-10-05 PROCEDURE — 93005 ELECTROCARDIOGRAM TRACING: CPT

## 2023-10-05 PROCEDURE — 80053 COMPREHEN METABOLIC PANEL: CPT

## 2023-10-05 PROCEDURE — 72125 CT NECK SPINE W/O DYE: CPT

## 2023-10-05 PROCEDURE — 96360 HYDRATION IV INFUSION INIT: CPT

## 2023-10-05 PROCEDURE — 99285 EMERGENCY DEPT VISIT HI MDM: CPT

## 2023-10-05 PROCEDURE — 85025 COMPLETE CBC W/AUTO DIFF WBC: CPT

## 2023-10-05 PROCEDURE — 70450 CT HEAD/BRAIN W/O DYE: CPT

## 2023-10-05 PROCEDURE — 84484 ASSAY OF TROPONIN QUANT: CPT

## 2023-10-05 RX ORDER — 0.9 % SODIUM CHLORIDE 0.9 %
1000 INTRAVENOUS SOLUTION INTRAVENOUS ONCE
Status: COMPLETED | OUTPATIENT
Start: 2023-10-05 | End: 2023-10-06

## 2023-10-05 RX ORDER — ACETAMINOPHEN 500 MG
1000 TABLET ORAL
Status: COMPLETED | OUTPATIENT
Start: 2023-10-05 | End: 2023-10-06

## 2023-10-05 ASSESSMENT — LIFESTYLE VARIABLES
HOW OFTEN DO YOU HAVE A DRINK CONTAINING ALCOHOL: MONTHLY OR LESS
HOW MANY STANDARD DRINKS CONTAINING ALCOHOL DO YOU HAVE ON A TYPICAL DAY: 1 OR 2

## 2023-10-05 ASSESSMENT — PAIN SCALES - GENERAL: PAINLEVEL_OUTOF10: 10

## 2023-10-05 ASSESSMENT — PAIN - FUNCTIONAL ASSESSMENT: PAIN_FUNCTIONAL_ASSESSMENT: 0-10

## 2023-10-05 ASSESSMENT — PAIN DESCRIPTION - LOCATION: LOCATION: BACK

## 2023-10-06 VITALS
HEART RATE: 66 BPM | TEMPERATURE: 97.9 F | SYSTOLIC BLOOD PRESSURE: 131 MMHG | WEIGHT: 138.45 LBS | OXYGEN SATURATION: 99 % | DIASTOLIC BLOOD PRESSURE: 82 MMHG | RESPIRATION RATE: 18 BRPM | BODY MASS INDEX: 21.73 KG/M2 | HEIGHT: 67 IN

## 2023-10-06 LAB
APPEARANCE UR: CLEAR
BACTERIA URNS QL MICRO: NEGATIVE /HPF
BILIRUB UR QL: NEGATIVE
COLOR UR: NORMAL
EKG ATRIAL RATE: 60 BPM
EKG DIAGNOSIS: NORMAL
EKG P AXIS: 19 DEGREES
EKG P-R INTERVAL: 152 MS
EKG Q-T INTERVAL: 416 MS
EKG QRS DURATION: 84 MS
EKG QTC CALCULATION (BAZETT): 416 MS
EKG R AXIS: -15 DEGREES
EKG T AXIS: -1 DEGREES
EKG VENTRICULAR RATE: 60 BPM
EPITH CASTS URNS QL MICRO: NORMAL /LPF
GLUCOSE UR STRIP.AUTO-MCNC: NEGATIVE MG/DL
HGB UR QL STRIP: NEGATIVE
HYALINE CASTS URNS QL MICRO: NORMAL /LPF (ref 0–2)
KETONES UR QL STRIP.AUTO: NEGATIVE MG/DL
LEUKOCYTE ESTERASE UR QL STRIP.AUTO: NEGATIVE
NITRITE UR QL STRIP.AUTO: NEGATIVE
PH UR STRIP: 7.5 (ref 5–8)
PROT UR STRIP-MCNC: NEGATIVE MG/DL
RBC #/AREA URNS HPF: NORMAL /HPF (ref 0–5)
SP GR UR REFRACTOMETRY: 1
URINE CULTURE IF INDICATED: NORMAL
UROBILINOGEN UR QL STRIP.AUTO: 0.2 EU/DL (ref 0.2–1)
WBC URNS QL MICRO: NORMAL /HPF (ref 0–4)

## 2023-10-06 PROCEDURE — 96360 HYDRATION IV INFUSION INIT: CPT

## 2023-10-06 PROCEDURE — 96361 HYDRATE IV INFUSION ADD-ON: CPT

## 2023-10-06 PROCEDURE — 81001 URINALYSIS AUTO W/SCOPE: CPT

## 2023-10-06 PROCEDURE — 2580000003 HC RX 258: Performed by: EMERGENCY MEDICINE

## 2023-10-06 PROCEDURE — 6370000000 HC RX 637 (ALT 250 FOR IP): Performed by: EMERGENCY MEDICINE

## 2023-10-06 RX ADMIN — ACETAMINOPHEN 1000 MG: 500 TABLET ORAL at 00:29

## 2023-10-06 RX ADMIN — SODIUM CHLORIDE 1000 ML: 9 INJECTION, SOLUTION INTRAVENOUS at 00:27

## 2023-10-06 ASSESSMENT — ENCOUNTER SYMPTOMS
DIARRHEA: 0
ABDOMINAL PAIN: 0
SHORTNESS OF BREATH: 0
NAUSEA: 0
BACK PAIN: 1
VOMITING: 0

## 2023-10-06 ASSESSMENT — PAIN - FUNCTIONAL ASSESSMENT: PAIN_FUNCTIONAL_ASSESSMENT: ACTIVITIES ARE NOT PREVENTED

## 2023-10-06 ASSESSMENT — PAIN SCALES - GENERAL: PAINLEVEL_OUTOF10: 7

## 2023-10-06 ASSESSMENT — PAIN DESCRIPTION - ORIENTATION: ORIENTATION: MID;LOWER

## 2023-10-06 ASSESSMENT — PAIN DESCRIPTION - DESCRIPTORS: DESCRIPTORS: ACHING;THROBBING

## 2023-10-06 ASSESSMENT — PAIN DESCRIPTION - LOCATION: LOCATION: BACK

## 2023-10-06 NOTE — DISCHARGE INSTRUCTIONS
It was a pleasure taking care of you in our Emergency Department today. We know that when you come to [facility], you are entrusting us with your health, comfort, and safety. Our physicians and nurses honor that trust, and truly appreciate the opportunity to care for you and your loved ones. We also value your feedback. If you receive a survey about your Emergency Department experience today, please fill it out. We care about our patients' feedback, and we listen to what you have to say. Thank you!       Dr. Eli Vinson MD.

## 2023-10-06 NOTE — ED PROVIDER NOTES
EMERGENCY DEPARTMENT HISTORY AND PHYSICAL EXAM     ----------------------------------------------------------------------------  Please note that this dictation was completed with [email or web address], the [employer] voice recognition software. Quite often unanticipated grammatical, syntax, homophones, and other interpretive errors are inadvertently transcribed by the computer software. Please disregard these errors. Please excuse any errors that have escaped final proofreading  ----------------------------------------------------------------------------      Date: 10/5/2023  Patient Name: [address]     Chief Complaint   Patient presents with    Loss of Consciousness     Patient passed out and fell onto floor       History obtainted from:  Patient    Other independent source of history: none    HPI: Mireya Washington is a 59 y.o. female, with significant pmhx of prior kidney stones, syncopal episode without cause, who presents via EMS to the ED with c/o syncopal episode at home. Patient notes her  witnessed a episode. Notes that she stood up from the table to walk to the bathroom when she suddenly passed out and fell to the ground. Denies feeling dizziness before falling and is unsure as to what occurred. Notes having similar symptoms past that was worked up and did not find a source. Does not currently follow cardiology. Denies any associated chest pain, visual disturbance or nausea or vomiting at time my evaluation. PCP: Valente Morgan MD    Allergy List:   Allergies   Allergen Reactions    Other Other (See Comments)     ? antibiotic    Naproxen Sodium Nausea And Vomiting         Medications:  No current facility-administered medications for this encounter.      Current Outpatient Medications   Medication Sig Dispense Refill    diclofenac (VOLTAREN) 50 MG EC tablet TAKE ONE TABLET BY MOUTH TWICE DAILY AS NEEDED FOR PAIN (Patient not taking: Reported on 8/16/2023) 60

## 2023-10-06 NOTE — ED TRIAGE NOTES
Patient ambulatory to triage from waiting room with complaint of passing out. Patient's  reports patient passed out, fell to the floor and was diaphoretic. Patient reports this has happened to her before because of her chronic back pain.

## 2023-10-10 ENCOUNTER — OFFICE VISIT (OUTPATIENT)
Facility: CLINIC | Age: 58
End: 2023-10-10
Payer: COMMERCIAL

## 2023-10-10 VITALS
RESPIRATION RATE: 12 BRPM | HEART RATE: 69 BPM | TEMPERATURE: 98.4 F | DIASTOLIC BLOOD PRESSURE: 75 MMHG | WEIGHT: 140 LBS | HEIGHT: 67 IN | BODY MASS INDEX: 21.97 KG/M2 | SYSTOLIC BLOOD PRESSURE: 118 MMHG | OXYGEN SATURATION: 95 %

## 2023-10-10 DIAGNOSIS — G89.29 CHRONIC RIGHT-SIDED LOW BACK PAIN WITHOUT SCIATICA: ICD-10-CM

## 2023-10-10 DIAGNOSIS — D64.9 ANEMIA, UNSPECIFIED TYPE: ICD-10-CM

## 2023-10-10 DIAGNOSIS — Z23 NEEDS FLU SHOT: ICD-10-CM

## 2023-10-10 DIAGNOSIS — M54.50 CHRONIC RIGHT-SIDED LOW BACK PAIN WITHOUT SCIATICA: ICD-10-CM

## 2023-10-10 DIAGNOSIS — Z12.11 COLON CANCER SCREENING: ICD-10-CM

## 2023-10-10 DIAGNOSIS — R55 SYNCOPE, UNSPECIFIED SYNCOPE TYPE: Primary | ICD-10-CM

## 2023-10-10 PROCEDURE — 99214 OFFICE O/P EST MOD 30 MIN: CPT | Performed by: INTERNAL MEDICINE

## 2023-10-10 PROCEDURE — 90674 CCIIV4 VAC NO PRSV 0.5 ML IM: CPT | Performed by: INTERNAL MEDICINE

## 2023-10-10 PROCEDURE — 90471 IMMUNIZATION ADMIN: CPT | Performed by: INTERNAL MEDICINE

## 2023-10-12 LAB
FERRITIN SERPL-MCNC: 225 NG/ML (ref 15–150)
IRON SATN MFR SERPL: 15 % (ref 15–55)
IRON SERPL-MCNC: 39 UG/DL (ref 27–159)
TIBC SERPL-MCNC: 266 UG/DL (ref 250–450)
UIBC SERPL-MCNC: 227 UG/DL (ref 131–425)
VIT B12 SERPL-MCNC: 481 PG/ML (ref 232–1245)

## 2023-11-10 ENCOUNTER — TELEPHONE (OUTPATIENT)
Facility: CLINIC | Age: 58
End: 2023-11-10

## 2023-11-10 NOTE — TELEPHONE ENCOUNTER
Patient called and said that her  needs a letter for his job stating that his wife had a accident in there home. Please give pt a call to explain what she needs.

## 2023-11-10 NOTE — TELEPHONE ENCOUNTER
I called the patient and verified them by name and date of birth. Patients  needs a letter stating that the patient had a fall/accident in the home.  needs the letter to take to work.

## 2023-11-14 ENCOUNTER — TELEPHONE (OUTPATIENT)
Facility: CLINIC | Age: 58
End: 2023-11-14

## 2023-11-14 NOTE — TELEPHONE ENCOUNTER
Pt  stated they need a letter stating pt fell on accident and will be under providers care for insurance purposes, the previous letter did not say accident and needs to be reworded in order for insurance company to comply

## 2023-11-15 NOTE — TELEPHONE ENCOUNTER
I called the patient and verified them by name and date of birth. I informed her on the message per Dr. Beryl Tafoya. She stated understanding and will contact cardiology & ortho.